# Patient Record
Sex: MALE | Race: WHITE | Employment: OTHER | URBAN - METROPOLITAN AREA
[De-identification: names, ages, dates, MRNs, and addresses within clinical notes are randomized per-mention and may not be internally consistent; named-entity substitution may affect disease eponyms.]

---

## 2018-08-25 PROBLEM — T50.901A DRUG OVERDOSE, ACCIDENTAL OR UNINTENTIONAL, INITIAL ENCOUNTER: Status: ACTIVE | Noted: 2018-08-25

## 2021-08-30 ENCOUNTER — HOSPITAL ENCOUNTER (EMERGENCY)
Age: 46
Discharge: HOME | End: 2021-08-30
Attending: EMERGENCY MEDICINE
Payer: MEDICAID

## 2021-08-30 VITALS
HEART RATE: 95 BPM | SYSTOLIC BLOOD PRESSURE: 119 MMHG | HEIGHT: 71 IN | TEMPERATURE: 98.4 F | WEIGHT: 216 LBS | BODY MASS INDEX: 30.24 KG/M2 | DIASTOLIC BLOOD PRESSURE: 87 MMHG | OXYGEN SATURATION: 98 % | RESPIRATION RATE: 16 BRPM

## 2021-08-30 DIAGNOSIS — L08.9 INFECTED WOUND: ICD-10-CM

## 2021-08-30 DIAGNOSIS — S81.801A LEG WOUND, RIGHT, INITIAL ENCOUNTER: Primary | ICD-10-CM

## 2021-08-30 DIAGNOSIS — T14.8XXA INFECTED WOUND: ICD-10-CM

## 2021-08-30 LAB
ALBUMIN SERPL-MCNC: 4.3 GM/DL (ref 3.4–5)
ALP BLD-CCNC: 92 IU/L (ref 40–129)
ALT SERPL-CCNC: 13 U/L (ref 10–40)
ANION GAP SERPL CALCULATED.3IONS-SCNC: 8 MMOL/L (ref 4–16)
APTT: 33.3 SECONDS (ref 25.1–37.1)
AST SERPL-CCNC: 16 IU/L (ref 15–37)
BASOPHILS ABSOLUTE: 0 K/CU MM
BASOPHILS RELATIVE PERCENT: 0.4 % (ref 0–1)
BILIRUB SERPL-MCNC: 0.2 MG/DL (ref 0–1)
BILIRUBIN DIRECT: 0.2 MG/DL (ref 0–0.3)
BILIRUBIN, INDIRECT: 0 MG/DL (ref 0–0.7)
BUN BLDV-MCNC: 16 MG/DL (ref 6–23)
CALCIUM SERPL-MCNC: 9.9 MG/DL (ref 8.3–10.6)
CHLORIDE BLD-SCNC: 102 MMOL/L (ref 99–110)
CO2: 30 MMOL/L (ref 21–32)
CREAT SERPL-MCNC: 0.8 MG/DL (ref 0.9–1.3)
DIFFERENTIAL TYPE: ABNORMAL
EOSINOPHILS ABSOLUTE: 0.2 K/CU MM
EOSINOPHILS RELATIVE PERCENT: 1.4 % (ref 0–3)
GFR AFRICAN AMERICAN: >60 ML/MIN/1.73M2
GFR NON-AFRICAN AMERICAN: >60 ML/MIN/1.73M2
GLUCOSE BLD-MCNC: 102 MG/DL (ref 70–99)
HCT VFR BLD CALC: 38.5 % (ref 42–52)
HEMOGLOBIN: 12.2 GM/DL (ref 13.5–18)
IMMATURE NEUTROPHIL %: 0.3 % (ref 0–0.43)
INR BLD: 0.91 INDEX
LYMPHOCYTES ABSOLUTE: 2.9 K/CU MM
LYMPHOCYTES RELATIVE PERCENT: 25.3 % (ref 24–44)
MCH RBC QN AUTO: 29 PG (ref 27–31)
MCHC RBC AUTO-ENTMCNC: 31.7 % (ref 32–36)
MCV RBC AUTO: 91.7 FL (ref 78–100)
MONOCYTES ABSOLUTE: 0.6 K/CU MM
MONOCYTES RELATIVE PERCENT: 5.7 % (ref 0–4)
PDW BLD-RTO: 13.2 % (ref 11.7–14.9)
PLATELET # BLD: 301 K/CU MM (ref 140–440)
PMV BLD AUTO: 8.8 FL (ref 7.5–11.1)
POTASSIUM SERPL-SCNC: 4.2 MMOL/L (ref 3.5–5.1)
PROTHROMBIN TIME: 12.2 SECONDS (ref 11.7–14.5)
RBC # BLD: 4.2 M/CU MM (ref 4.6–6.2)
SEGMENTED NEUTROPHILS ABSOLUTE COUNT: 7.6 K/CU MM
SEGMENTED NEUTROPHILS RELATIVE PERCENT: 66.9 % (ref 36–66)
SODIUM BLD-SCNC: 140 MMOL/L (ref 135–145)
TOTAL IMMATURE NEUTOROPHIL: 0.03 K/CU MM
TOTAL PROTEIN: 7.3 GM/DL (ref 6.4–8.2)
WBC # BLD: 11.3 K/CU MM (ref 4–10.5)

## 2021-08-30 PROCEDURE — 80053 COMPREHEN METABOLIC PANEL: CPT

## 2021-08-30 PROCEDURE — 99283 EMERGENCY DEPT VISIT LOW MDM: CPT

## 2021-08-30 PROCEDURE — 85610 PROTHROMBIN TIME: CPT

## 2021-08-30 PROCEDURE — 85025 COMPLETE CBC W/AUTO DIFF WBC: CPT

## 2021-08-30 PROCEDURE — 6370000000 HC RX 637 (ALT 250 FOR IP): Performed by: EMERGENCY MEDICINE

## 2021-08-30 PROCEDURE — 85730 THROMBOPLASTIN TIME PARTIAL: CPT

## 2021-08-30 PROCEDURE — 82248 BILIRUBIN DIRECT: CPT

## 2021-08-30 RX ORDER — METOPROLOL SUCCINATE 25 MG/1
25 TABLET, EXTENDED RELEASE ORAL DAILY
COMMUNITY
Start: 2021-06-30 | End: 2021-12-27

## 2021-08-30 RX ORDER — CLOPIDOGREL BISULFATE 75 MG/1
75 TABLET ORAL DAILY
COMMUNITY
End: 2021-12-27

## 2021-08-30 RX ORDER — SULFAMETHOXAZOLE AND TRIMETHOPRIM 800; 160 MG/1; MG/1
1 TABLET ORAL ONCE
Status: COMPLETED | OUTPATIENT
Start: 2021-08-30 | End: 2021-08-30

## 2021-08-30 RX ORDER — AMITRIPTYLINE HYDROCHLORIDE 10 MG/1
10 TABLET, FILM COATED ORAL NIGHTLY
COMMUNITY
Start: 2021-06-22 | End: 2021-12-27

## 2021-08-30 RX ORDER — AMLODIPINE BESYLATE 5 MG/1
5 TABLET ORAL DAILY
COMMUNITY
Start: 2021-06-13 | End: 2021-12-27

## 2021-08-30 RX ORDER — SULFAMETHOXAZOLE AND TRIMETHOPRIM 800; 160 MG/1; MG/1
1 TABLET ORAL 2 TIMES DAILY
Qty: 20 TABLET | Refills: 0 | Status: SHIPPED | OUTPATIENT
Start: 2021-08-30 | End: 2021-09-09

## 2021-08-30 RX ADMIN — SULFAMETHOXAZOLE AND TRIMETHOPRIM 1 TABLET: 800; 160 TABLET ORAL at 19:48

## 2021-08-30 ASSESSMENT — PAIN DESCRIPTION - DESCRIPTORS: DESCRIPTORS: CONSTANT

## 2021-08-30 ASSESSMENT — PAIN SCALES - GENERAL: PAINLEVEL_OUTOF10: 10

## 2021-08-30 ASSESSMENT — PAIN DESCRIPTION - LOCATION: LOCATION: LEG

## 2021-08-30 ASSESSMENT — PAIN DESCRIPTION - PAIN TYPE: TYPE: CHRONIC PAIN

## 2021-08-30 ASSESSMENT — PAIN DESCRIPTION - ORIENTATION: ORIENTATION: RIGHT

## 2021-08-30 ASSESSMENT — PAIN DESCRIPTION - FREQUENCY: FREQUENCY: CONTINUOUS

## 2021-08-30 NOTE — ED PROVIDER NOTES
[address]  EMERGENCYDEPARTMENT ENCOUNTER      Pt Name: Franklin Austin  MRN: [id number]  [place] 1975of evaluation: 8/30/2021  Provider:Ar Faulkner MD    [address] [place] [address]       Chief Complaint   Patient presents with    Wound Infection     from incision 4 months ago          [facility]    Franklin Austin is a 39 y.o. male who presents to the emergency department with wound infection. Patient had a tibial fracture several months ago, he just had an abscess at the scar that popped open and has been getting red around the area. No fevers. Not feeling ill. No pain anywhere else. Nothing taken for the symptom. Nursing Notes were reviewed. REVIEW OF SYSTEMS       Review of Systems    10 point review of systems was performed and was negative exceptas specifically noted in the HPI. PAST MEDICAL HISTORY     Past Medical History:   Diagnosis Date    Hypertension          SURGICAL HISTORY       Past Surgical History:   Procedure Laterality Date    FRACTURE SURGERY      right leg          CURRENT MEDICATIONS       Previous Medications    AMITRIPTYLINE (ELAVIL) 10 MG TABLET    Take 10 mg by mouth nightly    AMLODIPINE (NORVASC) 5 MG TABLET    Take 5 mg by mouth daily    CLOPIDOGREL (PLAVIX) 75 MG TABLET    Take 75 mg by mouth daily    METOPROLOL SUCCINATE (TOPROL XL) 25 MG EXTENDED RELEASE TABLET    Take 25 mg by mouth daily       ALLERGIES     Patient has no known allergies. FAMILY HISTORY     History reviewed. No pertinent family history.        SOCIAL HISTORY       Social History     Socioeconomic History    Marital status:      Spouse name: None    Number of children: None    Years of education: None    Highest education level: None   Occupational History    None   Tobacco Use    Smoking status: Current Every Day Smoker     Packs/day: 1.00    Smokeless tobacco: Never Used   Substance and Sexual Activity    Alcohol use: Yes     Comment: social    Drug use: Yes     Types: Marijuana, Opiates     Sexual activity: Yes     Partners: Female   Other Topics Concern    None   Social History Narrative    None     Social Determinants of Health     Financial Resource Strain:     Difficulty of Paying Living Expenses:    Food Insecurity:     Worried About Running Out of Food in the Last Year:     [address] in the Last Year:    Transportation Needs:     Lack of Transportation (Medical):  Lack of Transportation (Non-Medical):    Physical Activity:     Days of Exercise per Week:     Minutes of Exercise per Session:    Stress:     Feeling of Stress :    Social Connections:     Frequency of Communication with Friends and Family:     Frequency of Social Gatherings with Friends and Family:     Attends Catholic Services:     Active Member of Clubs or Organizations:     Attends Club or Organization Meetings:     Marital Status:    Intimate Partner Violence:     Fear of Current or Ex-Partner:     Emotionally Abused:     Physically Abused:     Sexually Abused:        SCREENINGS             PHYSICAL EXAM       ED Triage Vitals   BP Temp Temp src Pulse Resp SpO2 Height Weight   08/30/21 1842 08/30/21 1841 -- 08/30/21 1841 08/30/21 1841 08/30/21 1841 08/30/21 1841 08/30/21 1841   119/87 98.4 °F (36.9 °C)  95 16 98 % 5' 11\" (1.803 m) 216 lb (98 kg)       Physical Exam  General appearance: Alert, cooperative, no distress, appears stated age. Head:  Normocephalic, without obvious abnormality, atraumatic. HEENT: Mucous membranes moist.  Neck: Full ROM, trachea midline, no JVD  Lungs: No respiratory distress  Cardiovasular: Perfusing extremities  Abdomen: Nontender, no guarding  Extremities: Atraumatic, full ROM  Skin: There is a recently broken abscess without any remaining fluid collection with surrounding erythema and warmth. Minimal active purulence.   Neurologic: Alert and oriented x3, motor grossly normal, clear speech      DIAGNOSTIC RESULTS     EKG:     IMAGING:   Non-plain film images such as CT, Ultrasound and MRI are read by the radiologist.Plain radiographic images are visualized and preliminarily interpreted by the emergency physician with the below findings:        Interpretation per the Radiologist below, if available at the time of this note:    No orders to display         EDBEDSIDE ULTRASOUND:   Performed by Kiran Leroy - none    LABS:  Labs Reviewed   CBC WITH AUTO DIFFERENTIAL   BASIC METABOLIC PANEL   HEPATIC FUNCTION PANEL   APTT   PROTIME-INR       All other labs were within normal range or not returned as of this dictation. EMERGENCY DEPARTMENT COURSE and DIFFERENTIAL DIAGNOSIS/MDM:   Vitals:    Vitals:    08/30/21 1841 08/30/21 1842   BP:  119/87   Pulse: 95    Resp: 16    Temp: 98.4 °F (36.9 °C)    SpO2: 98%    Weight: 216 lb (98 kg)    Height: 5' 11\" (1.803 m)        Medications   sulfamethoxazole-trimethoprim (BACTRIM DS;SEPTRA DS) 800-160 MG per tablet 1 tablet (has no administration in time range)       MDM     Patient presents with right shin pain. Vital signs are stable, examination shows patient in no acute distress sitting comfortably on room air. He has a wound on his right shin that has surrounding cellulitis, no incision and drainage needed because there is no remaining fluid collection. I will prescribe Bactrim for 10 days since the patient is tolerated this before. REASSESSMENT          CRITICAL CARE TIME   Critical Care time was 0 minutes, excluding separately reportable procedures. There was a high probability of clinically significant/life threatening deteriorationin the patient's condition which required my urgent intervention. CONSULTS:  None     PROCEDURES:  Unless otherwise noted below, none     Procedures    FINAL IMPRESSION      1. Leg wound, right, initial encounter    2.  Infected wound          DISPOSITION/PLAN   DISPOSITION Decision To Discharge 08/30/2021 07:44:42 PM      PATIENT REFERRED TO:  [address] [address]  [phone]    If symptoms worsen      DISCHARGE MEDICATIONS:  New Prescriptions    SULFAMETHOXAZOLE-TRIMETHOPRIM (BACTRIM DS) 800-160 MG PER TABLET    Take 1 tablet by mouth 2 times daily for 10 days          (Please note that portions of this note were completed with a voicerecognition program.  Efforts were made to edit the dictations but occasionally words are mis-transcribed.)    Kae Dubin, MD (electronically signed)  Attending Emergency Physician            Kae Dubin, MD  08/30/21 0788

## 2021-08-30 NOTE — ED NOTES
Patient verbalizes understanding of discharge instructions. Patient walks out of ED with an upright and steady gait with even and unlabored respirations.       Barb Dillard RN  08/30/21 4036

## 2021-12-27 ENCOUNTER — APPOINTMENT (OUTPATIENT)
Dept: ULTRASOUND IMAGING | Age: 46
End: 2021-12-27
Payer: MEDICAID

## 2021-12-27 ENCOUNTER — APPOINTMENT (OUTPATIENT)
Dept: GENERAL RADIOLOGY | Age: 46
End: 2021-12-27
Payer: MEDICAID

## 2021-12-27 ENCOUNTER — HOSPITAL ENCOUNTER (EMERGENCY)
Age: 46
Discharge: HOME | End: 2021-12-27
Attending: EMERGENCY MEDICINE
Payer: MEDICAID

## 2021-12-27 VITALS
RESPIRATION RATE: 18 BRPM | TEMPERATURE: 98.4 F | OXYGEN SATURATION: 99 % | DIASTOLIC BLOOD PRESSURE: 79 MMHG | BODY MASS INDEX: 32.2 KG/M2 | WEIGHT: 230 LBS | HEART RATE: 68 BPM | HEIGHT: 71 IN | SYSTOLIC BLOOD PRESSURE: 125 MMHG

## 2021-12-27 DIAGNOSIS — M79.604 RIGHT LEG PAIN: Primary | ICD-10-CM

## 2021-12-27 LAB
ANION GAP SERPL CALCULATED.3IONS-SCNC: 7 MMOL/L (ref 4–16)
BASE EXCESS MIXED: 0.8 (ref 0–1.2)
BASE EXCESS: ABNORMAL (ref 0–3.3)
BASOPHILS ABSOLUTE: 0 K/CU MM
BASOPHILS RELATIVE PERCENT: 0.2 % (ref 0–1)
BUN BLDV-MCNC: 16 MG/DL (ref 6–23)
CALCIUM SERPL-MCNC: 9.6 MG/DL (ref 8.3–10.6)
CHLORIDE BLD-SCNC: 109 MMOL/L (ref 99–110)
CO2: 27 MMOL/L (ref 21–32)
CO2: 27 MMOL/L (ref 21–32)
CREAT SERPL-MCNC: 0.6 MG/DL (ref 0.9–1.3)
DIFFERENTIAL TYPE: ABNORMAL
EOSINOPHILS ABSOLUTE: 0 K/CU MM
EOSINOPHILS RELATIVE PERCENT: 0.2 % (ref 0–3)
GFR AFRICAN AMERICAN: >60 ML/MIN/1.73M2
GFR NON-AFRICAN AMERICAN: >60 ML/MIN/1.73M2
GLUCOSE BLD-MCNC: 110 MG/DL (ref 70–99)
GLUCOSE BLD-MCNC: 110 MG/DL (ref 70–99)
HCO3 ARTERIAL: 25.5 MMOL/L (ref 18–23)
HCT VFR BLD CALC: 43.3 % (ref 42–52)
HCT VFR BLD CALC: 48 % (ref 42–52)
HEMOGLOBIN: 14.2 GM/DL (ref 13.5–18)
HEMOGLOBIN: 16.2 GM/DL (ref 13.5–18)
IMMATURE NEUTROPHIL %: 0.3 % (ref 0–0.43)
LYMPHOCYTES ABSOLUTE: 2.1 K/CU MM
LYMPHOCYTES RELATIVE PERCENT: 13.9 % (ref 24–44)
MCH RBC QN AUTO: 29.3 PG (ref 27–31)
MCHC RBC AUTO-ENTMCNC: 32.8 % (ref 32–36)
MCV RBC AUTO: 89.5 FL (ref 78–100)
MONOCYTES ABSOLUTE: 0.7 K/CU MM
MONOCYTES RELATIVE PERCENT: 4.7 % (ref 0–4)
O2 SATURATION: 53 % (ref 96–97)
PCO2 ARTERIAL: 40.2 MMHG (ref 32–45)
PDW BLD-RTO: 13.1 % (ref 11.7–14.9)
PH BLOOD: 7.41 (ref 7.34–7.45)
PLATELET # BLD: 278 K/CU MM (ref 140–440)
PMV BLD AUTO: 9.2 FL (ref 7.5–11.1)
PO2 ARTERIAL: 27.8 MMHG (ref 75–100)
POC CALCIUM: 1.15 MMOL/L (ref 1.12–1.32)
POC CHLORIDE: 109 MMOL/L (ref 98–109)
POC CREATININE: 0.6 MG/DL (ref 0.9–1.3)
POTASSIUM SERPL-SCNC: 3.9 MMOL/L (ref 3.5–4.5)
POTASSIUM SERPL-SCNC: 3.9 MMOL/L (ref 3.5–5.1)
RBC # BLD: 4.84 M/CU MM (ref 4.6–6.2)
SEGMENTED NEUTROPHILS ABSOLUTE COUNT: 12 K/CU MM
SEGMENTED NEUTROPHILS RELATIVE PERCENT: 80.7 % (ref 36–66)
SODIUM BLD-SCNC: 143 MMOL/L (ref 135–145)
SODIUM BLD-SCNC: 143 MMOL/L (ref 138–146)
SOURCE, BLOOD GAS: ABNORMAL
TOTAL IMMATURE NEUTOROPHIL: 0.04 K/CU MM
WBC # BLD: 14.9 K/CU MM (ref 4–10.5)

## 2021-12-27 PROCEDURE — 6360000002 HC RX W HCPCS: Performed by: EMERGENCY MEDICINE

## 2021-12-27 PROCEDURE — 82803 BLOOD GASES ANY COMBINATION: CPT

## 2021-12-27 PROCEDURE — 85025 COMPLETE CBC W/AUTO DIFF WBC: CPT

## 2021-12-27 PROCEDURE — 73590 X-RAY EXAM OF LOWER LEG: CPT

## 2021-12-27 PROCEDURE — 85014 HEMATOCRIT: CPT

## 2021-12-27 PROCEDURE — 80051 ELECTROLYTE PANEL: CPT

## 2021-12-27 PROCEDURE — 80048 BASIC METABOLIC PNL TOTAL CA: CPT

## 2021-12-27 PROCEDURE — 85018 HEMOGLOBIN: CPT

## 2021-12-27 PROCEDURE — 82565 ASSAY OF CREATININE: CPT

## 2021-12-27 PROCEDURE — 99283 EMERGENCY DEPT VISIT LOW MDM: CPT

## 2021-12-27 PROCEDURE — 93971 EXTREMITY STUDY: CPT

## 2021-12-27 PROCEDURE — 82962 GLUCOSE BLOOD TEST: CPT

## 2021-12-27 PROCEDURE — 6370000000 HC RX 637 (ALT 250 FOR IP): Performed by: EMERGENCY MEDICINE

## 2021-12-27 PROCEDURE — 96372 THER/PROPH/DIAG INJ SC/IM: CPT

## 2021-12-27 RX ORDER — ONDANSETRON 4 MG/1
4 TABLET, ORALLY DISINTEGRATING ORAL ONCE
Status: COMPLETED | OUTPATIENT
Start: 2021-12-27 | End: 2021-12-27

## 2021-12-27 RX ORDER — HYDROCODONE BITARTRATE AND ACETAMINOPHEN 5; 325 MG/1; MG/1
1 TABLET ORAL EVERY 6 HOURS PRN
Qty: 5 TABLET | Refills: 0 | Status: SHIPPED | OUTPATIENT
Start: 2021-12-27 | End: 2021-12-29

## 2021-12-27 RX ORDER — HYDROMORPHONE HCL 110MG/55ML
0.5 PATIENT CONTROLLED ANALGESIA SYRINGE INTRAVENOUS ONCE
Status: COMPLETED | OUTPATIENT
Start: 2021-12-27 | End: 2021-12-27

## 2021-12-27 RX ADMIN — HYDROMORPHONE HYDROCHLORIDE 0.5 MG: 2 INJECTION INTRAMUSCULAR; INTRAVENOUS; SUBCUTANEOUS at 08:38

## 2021-12-27 RX ADMIN — ONDANSETRON 4 MG: 4 TABLET, ORALLY DISINTEGRATING ORAL at 08:37

## 2021-12-27 ASSESSMENT — PAIN DESCRIPTION - ORIENTATION: ORIENTATION: RIGHT;LOWER

## 2021-12-27 ASSESSMENT — PAIN DESCRIPTION - LOCATION: LOCATION: LEG;ANKLE

## 2021-12-27 ASSESSMENT — PAIN SCALES - GENERAL
PAINLEVEL_OUTOF10: 10
PAINLEVEL_OUTOF10: 6
PAINLEVEL_OUTOF10: 10

## 2021-12-27 ASSESSMENT — PAIN DESCRIPTION - PAIN TYPE: TYPE: ACUTE PAIN

## 2021-12-27 ASSESSMENT — PAIN DESCRIPTION - DESCRIPTORS: DESCRIPTORS: ACHING

## 2021-12-27 ASSESSMENT — PAIN DESCRIPTION - FREQUENCY: FREQUENCY: CONTINUOUS

## 2021-12-27 NOTE — ED PROVIDER NOTES
Emergency Department Encounter    Patient: Rocco Durant  MRN: [id number]  : 1975  Date of Evaluation: 2021  ED Provider:  Stephon Hylton MD    Triage Chief Complaint:   Leg Pain (right lower leg/ankle)    Clinical Impression:  1. Right leg pain      Disposition referral (if applicable):  Doris Elgin, DO  [address]  [phone]    Call in 1 day      ED Provider Disposition Time  DISPOSITION Decision To Discharge 2021 12:12:08 PM       MDM:  Patient with history of right lower extremity pain status post fracture and orthopedic surgery as below presents with left lower extremity/ankle pain. Patient is not using a cam boot, brace or orthotic. Patient does have a leukocytosis as below. Clinically, there is no clear evidence of infection. X-ray evaluation shows partially healed fracture of the tibia and intact hardware involving the fibula. Patient is neurovascularly intact. Venous Doppler ultrasound was obtained which showed no evidence of DVT. Compartments are soft with no evidence of compartment syndrome. No arterial insufficiency. Dr Tatum--on call for Dr Eller--recommended elevation and rest.  Strict return precautions for increased pain, discharge from the wound, spreading redness, fever, or other concerns. He was okay with patient being discharged with follow-up with Dr. Kirill Kirby. Pain treated as below in the emergency department. Small course of outpatient pain medication as below. Patient discharged with strict return precautions and follow-up instructions. Patient verbalized understanding and agreement to plan.     Medications ordered in the ED:  ED Medication Orders (From admission, onward)    Start Ordered     Status Ordering Provider    21 0815 21 0805  HYDROmorphone (DILAUDID) injection 0.5 mg  ONCE         Last MAR action: Given - by Santana Mejia on 21 at [address]    21 0815 21 0805  ondansetron (ZOFRAN-ODT) disintegrating tablet 4 mg  ONCE         Last MAR action: Given - by Esau Pitt on 12/27/21 at 0837 [facility]          Disposition medications (if applicable):  Discharge Medication List as of 12/27/2021 12:24 PM      START taking these medications    Details   HYDROcodone-acetaminophen (NORCO) 5-325 MG per tablet Take 1 tablet by mouth every 6 hours as needed for Pain for up to 2 days. Intended supply: 3 days. Take lowest dose possible to manage pain, Disp-5 tablet, R-0Normal               HPI:  Duran Pleitez is a 42 y.o. male that presents complaining of 2-week history of 10 out of 10 pain in the right ankle/distal leg. Patient states that approximately 8 to 9 months ago, he fractured his tibia/fibula requiring orthopedic surgery. Surgery was complicated by infected hardware with washouts and revision. Patient states that surgeon is Dr. Bishop Diaz. Patient is currently on doxycycline. Patient denies fever, nausea, vomiting, diarrhea. No chest pain or shortness of breath. Patient states that he has had intermittent similar symptoms although over the past 2 weeks, he is noted some waxing and waning swelling of the right ankle, worsened discoloration at the operative site, and increased pain. No radiation. No known alleviating factors. Patient has been treating with over-the-counter medications.     ROS - see HPI, below listed is current ROS at time of my eval:  General:  No fevers  Eyes:  No eye discharge  ENT:  No ear discharge  Cardiovascular:  No palpitations  Respiratory:  No wheezing  Gastrointestinal:  No hematemesis  Skin:  No petechiae  Neurologic:  No headache  Psychiatric:  No homicidal ideation  Genitourinary:  No hematuria  Endocrine:  No unexpected weight gain  Extremities: Mild edema of the right ankle    Physical Exam:  Triage VS:    ED Triage Vitals [12/27/21 0749]   Enc Vitals Group      /87      Pulse 92      Resp 18      Temp 98.4 °F (36.9 °C)      Temp Source Oral file   Social History Narrative    Not on file     Social Determinants of Health     Financial Resource Strain:     Difficulty of Paying Living Expenses: Not on file   Food Insecurity:     Worried About Running Out of Food in the Last Year: Not on file    [place] of Food in the Last Year: Not on file   Transportation Needs:     Lack of Transportation (Medical): Not on file    Lack of Transportation (Non-Medical): Not on file   Physical Activity:     Days of Exercise per Week: Not on file    Minutes of Exercise per Session: Not on file   Stress:     Feeling of Stress : Not on file   Social Connections:     Frequency of Communication with Friends and Family: Not on file    Frequency of Social Gatherings with Friends and Family: Not on file    Attends Adventist Services: Not on file    Active Member of [address] [place] [address] [employer] or Organizations: Not on file    Attends Club or Organization Meetings: Not on file    Marital Status: Not on file   Intimate Partner Violence:     Fear of Current or Ex-Partner: Not on file    Emotionally Abused: Not on file    Physically Abused: Not on file    Sexually Abused: Not on file   Housing Stability:     Unable to Pay for Housing in the Last Year: Not on file    Number of [place] in the Last Year: Not on file    Unstable Housing in the Last Year: Not on file     No current facility-administered medications for this encounter. Current Outpatient Medications   Medication Sig Dispense Refill    HYDROcodone-acetaminophen (NORCO) 5-325 MG per tablet Take 1 tablet by mouth every 6 hours as needed for Pain for up to 2 days. Intended supply: 3 days.  Take lowest dose possible to manage pain 5 tablet 0     No Known Allergies    Nursing Notes Reviewed    I have reviewed and interpreted all of the currently available lab results from this visit (if applicable):  Results for orders placed or performed during the hospital encounter of 12/27/21   CBC auto diff   Result Value Ref Range WBC 14.9 (H) 4.0 - 10.5 K/CU MM    RBC 4.84 4.6 - 6.2 M/CU MM    Hemoglobin 14.2 13.5 - 18.0 GM/DL    Hematocrit 43.3 42 - 52 %    MCV 89.5 78 - 100 FL    MCH 29.3 27 - 31 PG    MCHC 32.8 32.0 - 36.0 %    RDW 13.1 11.7 - 14.9 %    Platelets 614 154 - 400 K/CU MM    MPV 9.2 7.5 - 11.1 FL    Differential Type AUTOMATED DIFFERENTIAL     Segs Relative 80.7 (H) 36 - 66 %    Lymphocytes % 13.9 (L) 24 - 44 %    Monocytes % 4.7 (H) 0 - 4 %    Eosinophils % 0.2 0 - 3 %    Basophils % 0.2 0 - 1 %    Segs Absolute 12.0 K/CU MM    Lymphocytes Absolute 2.1 K/CU MM    Monocytes Absolute 0.7 K/CU MM    Eosinophils Absolute 0.0 K/CU MM    Basophils Absolute 0.0 K/CU MM    Immature Neutrophil % 0.3 0 - 0.43 %    Total Immature Neutrophil 0.04 K/CU MM   BMP   Result Value Ref Range    Sodium 143 135 - 145 MMOL/L    Potassium 3.9 3.5 - 5.1 MMOL/L    Chloride 109 99 - 110 mMol/L    CO2 27 21 - 32 MMOL/L    Anion Gap 7 4 - 16    BUN 16 6 - 23 MG/DL    CREATININE 0.6 (L) 0.9 - 1.3 MG/DL    Glucose 110 (H) 70 - 99 MG/DL    Calcium 9.6 8.3 - 10.6 MG/DL    GFR Non-African American >60 >60 mL/min/1.73m2    GFR African American >60 >60 mL/min/1.73m2   POC CRITICAL CARE PROFILE   Result Value Ref Range    pH, Bld 7.41 7.34 - 7.45    pCO2, Arterial 40.2 32 - 45 MMHG    pO2, Arterial 27.8 (L) 75 - 100 MMHG    HCO3, Arterial 25.5 (H) 18 - 23 MMOL/L    Base Excess HIDE 0 - 3.3    Base Exc, Mixed 0.8 0 - 1.2    O2 Sat 53.0 (L) 96 - 97 %    CO2 27 21 - 32 MMOL/L    Sodium 143 138 - 146 MMOL/L    Potassium 3.9 3.5 - 4.5 MMOL/L    POC CALCIUM 1.15 1.12 - 1.32 MMOL/L    POC Glucose 110 (H) 70 - 99 MG/DL    Hematocrit 48.0 42 - 52 %    Hemoglobin 16.2 13.5 - 18.0 GM/DL    POC Chloride 109 98 - 109 MMOL/L    POC Creatinine 0.6 (L) 0.9 - 1.3 MG/DL    Source: Venous       Radiographs (if obtained):  Radiologist's Report Reviewed:  VL DUP LOWER EXTREMITY VENOUS RIGHT   Final Result   No evidence of DVT in the right lower extremity.       RECOMMENDATIONS: Unavailable         XR TIBIA FIBULA RIGHT (2 VIEWS)   Preliminary Result   Posttraumatic and postsurgical changes of the distal tibia and fibula. Comment: Please note this report has been produced using speech recognition software and may contain errors related to that system including errors in grammar, punctuation, and spelling, as well as words and phrases that may be inappropriate. Efforts were made to edit the dictations.         Joaquina Christina MD  12/28/21 2100

## 2021-12-27 NOTE — ED NOTES
Discharge instructions and prescriptions were reviewed and the patient will follow up with Ortho as discussed. The patient understands these instructions.                Tamiko Franklin RN  12/27/21 8784

## 2021-12-27 NOTE — ED NOTES
The patient presents to the er today with complaints of right ankle/leg pain. He reports that he fell 26 feet a few years ago and fractures the right leg. He had surgery on that leg and now is having increased pain.        Shelly Austin RN  12/27/21 7775

## 2022-01-03 ENCOUNTER — OFFICE VISIT (OUTPATIENT)
Dept: FAMILY MEDICINE CLINIC | Age: 47
End: 2022-01-03
Payer: MEDICAID

## 2022-01-03 VITALS
WEIGHT: 215.4 LBS | SYSTOLIC BLOOD PRESSURE: 110 MMHG | TEMPERATURE: 97.7 F | BODY MASS INDEX: 30.04 KG/M2 | OXYGEN SATURATION: 95 % | DIASTOLIC BLOOD PRESSURE: 64 MMHG | HEART RATE: 82 BPM

## 2022-01-03 DIAGNOSIS — R20.0 RIGHT SIDED NUMBNESS: ICD-10-CM

## 2022-01-03 DIAGNOSIS — Z11.4 SCREENING FOR HIV (HUMAN IMMUNODEFICIENCY VIRUS): ICD-10-CM

## 2022-01-03 DIAGNOSIS — Z86.2 HISTORY OF ANEMIA: ICD-10-CM

## 2022-01-03 DIAGNOSIS — D72.829 LEUKOCYTOSIS, UNSPECIFIED TYPE: ICD-10-CM

## 2022-01-03 DIAGNOSIS — T84.7XXS INFECTED HARDWARE IN RIGHT LEG, SEQUELA: Primary | ICD-10-CM

## 2022-01-03 DIAGNOSIS — L60.9 NAIL ABNORMALITIES: ICD-10-CM

## 2022-01-03 DIAGNOSIS — I42.9 CARDIOMYOPATHY, UNSPECIFIED TYPE (HCC): ICD-10-CM

## 2022-01-03 DIAGNOSIS — N17.9 ACUTE KIDNEY INJURY (HCC): ICD-10-CM

## 2022-01-03 DIAGNOSIS — F17.200 SMOKER: ICD-10-CM

## 2022-01-03 DIAGNOSIS — D50.8 IRON DEFICIENCY ANEMIA SECONDARY TO INADEQUATE DIETARY IRON INTAKE: ICD-10-CM

## 2022-01-03 DIAGNOSIS — I21.4 ACUTE NON-ST SEGMENT ELEVATION MYOCARDIAL INFARCTION (HCC): ICD-10-CM

## 2022-01-03 DIAGNOSIS — I25.10 ATHEROSCLEROSIS OF CORONARY ARTERY OF NATIVE HEART WITHOUT ANGINA PECTORIS, UNSPECIFIED VESSEL OR LESION TYPE: ICD-10-CM

## 2022-01-03 DIAGNOSIS — Z13.1 SCREENING FOR DIABETES MELLITUS: ICD-10-CM

## 2022-01-03 DIAGNOSIS — I46.9 CARDIAC ARREST (HCC): ICD-10-CM

## 2022-01-03 DIAGNOSIS — Z11.59 NEED FOR HEPATITIS C SCREENING TEST: ICD-10-CM

## 2022-01-03 PROBLEM — M62.82 NON-TRAUMATIC RHABDOMYOLYSIS: Status: ACTIVE | Noted: 2021-05-28

## 2022-01-03 PROBLEM — R01.1 CARDIAC MURMUR, UNSPECIFIED: Status: ACTIVE | Noted: 2022-01-03

## 2022-01-03 PROBLEM — I25.9 CHRONIC ISCHEMIC HEART DISEASE: Status: ACTIVE | Noted: 2022-01-03

## 2022-01-03 LAB
BASOPHILS ABSOLUTE: 0.1 K/UL (ref 0–0.2)
BASOPHILS RELATIVE PERCENT: 0.5 %
EOSINOPHILS ABSOLUTE: 0 K/UL (ref 0–0.6)
EOSINOPHILS RELATIVE PERCENT: 0.4 %
HCT VFR BLD CALC: 37.4 % (ref 40.5–52.5)
HEMOGLOBIN: 12.4 G/DL (ref 13.5–17.5)
HEPATITIS C ANTIBODY INTERPRETATION: NORMAL
LYMPHOCYTES ABSOLUTE: 2.5 K/UL (ref 1–5.1)
LYMPHOCYTES RELATIVE PERCENT: 26.2 %
MCH RBC QN AUTO: 30 PG (ref 26–34)
MCHC RBC AUTO-ENTMCNC: 33.1 G/DL (ref 31–36)
MCV RBC AUTO: 90.8 FL (ref 80–100)
MONOCYTES ABSOLUTE: 0.5 K/UL (ref 0–1.3)
MONOCYTES RELATIVE PERCENT: 5.4 %
NEUTROPHILS ABSOLUTE: 6.4 K/UL (ref 1.7–7.7)
NEUTROPHILS RELATIVE PERCENT: 67.5 %
PDW BLD-RTO: 14 % (ref 12.4–15.4)
PLATELET # BLD: 274 K/UL (ref 135–450)
PMV BLD AUTO: 8.3 FL (ref 5–10.5)
RBC # BLD: 4.12 M/UL (ref 4.2–5.9)
WBC # BLD: 9.5 K/UL (ref 4–11)

## 2022-01-03 PROCEDURE — G8417 CALC BMI ABV UP PARAM F/U: HCPCS | Performed by: FAMILY MEDICINE

## 2022-01-03 PROCEDURE — 99204 OFFICE O/P NEW MOD 45 MIN: CPT | Performed by: FAMILY MEDICINE

## 2022-01-03 PROCEDURE — 4004F PT TOBACCO SCREEN RCVD TLK: CPT | Performed by: FAMILY MEDICINE

## 2022-01-03 PROCEDURE — G8484 FLU IMMUNIZE NO ADMIN: HCPCS | Performed by: FAMILY MEDICINE

## 2022-01-03 PROCEDURE — 36415 COLL VENOUS BLD VENIPUNCTURE: CPT | Performed by: FAMILY MEDICINE

## 2022-01-03 PROCEDURE — G8428 CUR MEDS NOT DOCUMENT: HCPCS | Performed by: FAMILY MEDICINE

## 2022-01-03 RX ORDER — BUPROPION HYDROCHLORIDE 150 MG/1
150 TABLET, EXTENDED RELEASE ORAL 2 TIMES DAILY
Qty: 60 TABLET | Refills: 0 | Status: SHIPPED | OUTPATIENT
Start: 2022-01-03 | End: 2022-01-04 | Stop reason: RX

## 2022-01-03 RX ORDER — DOXYCYCLINE HYCLATE 100 MG
100 TABLET ORAL 2 TIMES DAILY
Qty: 30 TABLET | Refills: 0 | Status: SHIPPED | OUTPATIENT
Start: 2022-01-03 | End: 2022-02-01 | Stop reason: SDUPTHER

## 2022-01-03 RX ORDER — DOXYCYCLINE HYCLATE 100 MG
TABLET ORAL
COMMUNITY
Start: 2021-11-19 | End: 2022-01-03 | Stop reason: SDUPTHER

## 2022-01-03 ASSESSMENT — ENCOUNTER SYMPTOMS
ABDOMINAL PAIN: 0
VOMITING: 0
SHORTNESS OF BREATH: 0
DIARRHEA: 0
COUGH: 0

## 2022-01-03 NOTE — PATIENT INSTRUCTIONS
Stopping Smoking: Care Instructions  Your Care Instructions     Cigarette smokers crave the nicotine in cigarettes. Giving it up is much harder than simply changing a habit. Your body has to stop craving the nicotine. It is hard to quit, but you can do it. There are many tools that people use to quit smoking. You may find that combining tools works best for you. There are several steps to quitting. First you get ready to quit. Then you get support to help you. After that, you learn new skills and behaviors to become a nonsmoker. For many people, a necessary step is getting and using medicine. Your doctor will help you set up the plan that best meets your needs. You may want to attend a smoking cessation program to help you quit smoking. When you choose a program, look for one that has proven success. Ask your doctor for ideas. You will greatly increase your chances of success if you take medicine as well as get counseling or join a cessation program.  Some of the changes you feel when you first quit tobacco are uncomfortable. Your body will miss the nicotine at first, and you may feel short-tempered and grumpy. You may have trouble sleeping or concentrating. Medicine can help you deal with these symptoms. You may struggle with changing your smoking habits and rituals. The last step is the tricky one: Be prepared for the smoking urge to continue for a time. This is a lot to deal with, but keep at it. You will feel better. Follow-up care is a key part of your treatment and safety. Be sure to make and go to all appointments, and call your doctor if you are having problems. It's also a good idea to know your test results and keep a list of the medicines you take. How can you care for yourself at home? · Ask your family, friends, and coworkers for support. You have a better chance of quitting if you have help and support.   · Join a support group, such as Nicotine Anonymous, for people who are trying to quit smoking. · Consider signing up for a smoking cessation program, such as the American Lung Association's Freedom from Smoking program.  · Get text messaging support. Go to the website at www.smokefree. gov to sign up for the [facility] program.  · Set a quit date. Pick your date carefully so that it is not right in the middle of a big deadline or stressful time. Once you quit, do not even take a puff. Get rid of all ashtrays and lighters after your last cigarette. Clean your house and your clothes so that they do not smell of smoke. · Learn how to be a nonsmoker. Think about ways you can avoid those things that make you reach for a cigarette. ? Avoid situations that put you at greatest risk for smoking. For some people, it is hard to have a drink with friends without smoking. For others, they might skip a coffee break with coworkers who smoke. ? Change your daily routine. Take a different route to work or eat a meal in a different place. · Cut down on stress. Calm yourself or release tension by doing an activity you enjoy, such as reading a book, taking a hot bath, or gardening. · Talk to your doctor or pharmacist about nicotine replacement therapy, which replaces the nicotine in your body. You still get nicotine but you do not use tobacco. Nicotine replacement products help you slowly reduce the amount of nicotine you need. These products come in several forms, many of them available over-the-counter:  ? Nicotine patches  ? Nicotine gum and lozenges  ? Nicotine inhaler  · Ask your doctor about bupropion (Wellbutrin) or varenicline (Chantix), which are prescription medicines. They do not contain nicotine. They help you by reducing withdrawal symptoms, such as stress and anxiety. · Some people find hypnosis, acupuncture, and massage helpful for ending the smoking habit. · Eat a healthy diet and get regular exercise. Having healthy habits will help your body move past its craving for nicotine.   · Be prepared to keep trying. Most people are not successful the first few times they try to quit. Do not get mad at yourself if you smoke again. Make a list of things you learned and think about when you want to try again, such as next week, next month, or next year. Where can you learn more? Go to https://chpenohemi.[employer]ing. org and sign in to your [employer] account. Enter C257 in the [employer] box to learn more about \"Stopping Smoking: Care Instructions. \"     If you do not have an account, please click on the \"Sign Up Now\" link. Current as of: February 11, 2021               Content Version: 13.1  © 2006-2021 [employer]. Care instructions adapted under license by [facility] ([facility]). If you have questions about a medical condition or this instruction, always ask your healthcare professional. [place] [address] any warranty or liability for your use of this information. Learning About Benefits From Quitting Smoking  How does quitting smoking make you healthier? If you're thinking about quitting smoking, you may have a few reasons to be smoke-free. Your health may be one of them. · When you quit smoking, you lower your risks for cancer, lung disease, heart attack, stroke, blood vessel disease, and blindness from macular degeneration. · When you're smoke-free, you get sick less often, and you heal faster. You are less likely to get colds, flu, bronchitis, and pneumonia. · As a nonsmoker, you may find that your mood is better and you are less stressed. When and how will you feel healthier? Quitting has real health benefits that start from day 1 of being smoke-free. And the longer you stay smoke-free, the healthier you get and the better you feel. The first hours  · After just 20 minutes, your blood pressure and heart rate go down. That means there's less stress on your heart and blood vessels.   · Within 12 hours, the level of carbon monoxide in your blood drops back to normal. That makes room for more oxygen. With more oxygen in your body, you may notice that you have more energy than when you smoked. After 2 weeks  · Your lungs start to work better. · Your risk of heart attack starts to drop. After 1 month  · When your lungs are clear, you cough less and breathe deeper, so it's easier to be active. · Your sense of taste and smell return. That means you can enjoy food more than you have since you started smoking. Over the years  · Over the years, your risks of heart disease, heart attack, and stroke are lower. · After 10 years, your risk of dying from lung cancer is cut by about half. And your risk for many other types of cancer is lower too. How would quitting help others in your life? When you quit smoking, you improve the health of everyone who now breathes in your smoke. · Their heart, lung, and cancer risks drop, much like yours. · They are sick less. For babies and small children, living smoke-free means they're less likely to have ear infections, pneumonia, and bronchitis. · If you're a woman who is or will be pregnant someday, quitting smoking means a healthier . · Children who are close to you are less likely to become adult smokers. Where can you learn more? Go to https://[employer]stars. org and sign in to your [employer] account. Enter 966 299 72 96 in the [facility] box to learn more about \"Learning About Benefits From Quitting Smoking. \"     If you do not have an account, please click on the \"Sign Up Now\" link. Current as of: 2021               Content Version: 13.1  © 7045-3134 [employer]wise[employer] Inc[employer]. Care instructions adapted under license by [facility] ([facility]). If you have questions about a medical condition or this instruction, always ask your healthcare professional. [place] [address] any warranty or liability for your use of this information.

## 2022-01-03 NOTE — ASSESSMENT & PLAN NOTE
Refer to a dermatologist to evaluate the nail abnormalities and skin abnormalities of the hands. I will also order an iron level.

## 2022-01-03 NOTE — ASSESSMENT & PLAN NOTE
I will order a temporary refill of doxycycline in case if his orthopedic doctor has not done it. He does have a pending appointment with an orthopedic doctor within the next week or 2.

## 2022-01-03 NOTE — PROGRESS NOTES
1/3/22    Clyde Villagomez  1975    SUBJECTIVE    HPI - Marbin Taveras is a 42 y.o. male who presents today for evaluation of:  Chief Complaint   Patient presents with   [facility].     In summary he had a right tibia and fibula fracture in April 2021. When he was weaned off of prescribed pain medications in May or June 2021 he took a street drug that had fentanyl in it and was found by his significant other in cardiac arrest.  During the recovery from the cardiac arrest he had an infection in the right lower leg and some screws on the original hardware were broken. Currently his orthopedic doctors think that he will likely need a right lower leg amputation and he has a pending appointment with a plastic surgeon to get another opinion. He would like an opinion from another orthopedic surgeon, too. RT tib/fib fx: 4/2021. He is in the process of recovery and under ortho care. There was a break in 2 screws and infection. He has pending appts with plastic surgeon. He has been taking doxycycline x 4 months. After the leg injury in June 2021 he had an accidental OD with a med containing fentanyl with related cardiac arrest, MI, THI. Hx of whole rt side swelling and compartment syndrome. He has ongoing tingling rt side of body. He still has tingling rt post thigh and rt half of scrotum. Depression: situational, no hx of depression prior. Hand and nails : rash x 16 yr. 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 Left 1 & 3 & 4 nails affected. Rt 3 nail affected. Review of Systems   Constitutional: Negative for fatigue and fever. Respiratory: Negative for cough and shortness of breath. Cardiovascular: Positive for leg swelling. Negative for chest pain. Gastrointestinal: Negative for abdominal pain, diarrhea and vomiting. Psychiatric/Behavioral: Positive for dysphoric mood, self-injury and suicidal ideas.      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 OBJECTIVE    /64 (Site: Left Upper Arm, Position: Sitting, Cuff Size: Large Adult)   Pulse 82   Temp 97.7 °F (36.5 °C) (Infrared)   Wt 215 lb 6.4 oz (97.7 kg)   SpO2 95%   BMI 30.04 kg/m²     Physical Exam   Constitutional:       General: Not in acute distress. Appearance: Normal appearance. Not ill-appearing. Eyes:      General: No scleral icterus. Cardiovascular:      Rate and Rhythm: Normal rate and regular rhythm. Heart sounds: No murmur heard. No friction rub. No gallop. Normal bilateral posterior tibial and dorsalis pedis pulses. Pulmonary:      Effort: Pulmonary effort is normal. No respiratory distress. Breath sounds: No wheezing, rhonchi or rales. Abdominal:      Palpations: Abdomen is soft. There is no mass. Tenderness: There is no abdominal tenderness. Musculoskeletal:     Moves all extremities normally. Skin:     General: Skin is warm. Coloration: Skin is not jaundiced. The skin on both feet is very normal.  Minor abnormality of left toenails. Left thumb, middle finger, and ring finger fingernails are quite deformed. On the right hand the middle finger nail is deformed and appears to possibly have onychomycosis. The left hand palm has a lot of dryness and mild erythema of the skin. The right fingers are affected similarly to the left hand but the right hand has less involvement. There is some scarring of the right lower shin. Neurological:      Mental Status: She is alert. Psychiatric:         Behavior: Behavior normal.         Thought Content: Thought content normal.         Judgment: Judgment normal.    Reviewed BMP and CMP dated 12/27/2021. The anemia had resolved and hemoglobin was 14.2. Of note however is that the white blood cell count was up to 14.9. The acute kidney failure had resolved and GFR was greater than 60. ASSESSMENT/PLAN:    1.  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 He does have a pending appointment with an orthopedic doctor within the next week or 2. Orders:  -     doxycycline hyclate (VIBRA-TABS) 100 MG tablet; Take 1 tablet by mouth 2 times daily, Disp-30 tablet, R-0Normal  -     External Referral To Infectious Disease  -     [address]  2. Atherosclerosis of coronary artery of native heart without angina pectoris, unspecified vessel or lesion type  Assessment & Plan:   Under care of cardiologist who has told him he needs yearly follow-ups. 3. Smoker  Assessment & Plan:   I stressed the importance of smoking cessation for lower extremity circulation so that he has the best chance of healing from the infection. He is willing to try and I have entered a prescription for bupropion to help with quitting smoking. Orders:  -     buPROPion (ZYBAN) 150 MG extended release tablet; Take 1 tablet by mouth 2 times daily, Disp-60 tablet, R-0Normal  4. Cardiomyopathy, unspecified type [facility])  Assessment & Plan:   He states that his cardiologist has told him he only needs yearly follow-ups. Orders:  -     Comprehensive Metabolic Panel  5. Cardiac arrest [facility])  Assessment & Plan:   He states that his cardiologist has cleared him and told him he only needs yearly follow-ups. 6. Acute non-ST segment elevation myocardial infarction [facility])  Assessment & Plan:   Cardiology care with yearly follow-ups. Orders:  -     Lipid Panel  7. Acute kidney injury [facility])  Assessment & Plan:   I will check a CMP to evaluate his current kidney status. Orders:  -     Comprehensive Metabolic Panel  8. History of anemia  Assessment & Plan:   I will check a CBC to evaluate the current status of anemia. Orders:  -     CBC Auto Differential  -     Iron and TIBC  9.  Nail abnormalities  Assessment & Plan:   Refer to a dermatologist to evaluate the nail abnormalities and skin abnormalities of the hands. I will also order an iron level. Orders:  -     Iron and TIBC  -     Amb External Referral To Dermatology  10. Right sided numbness  -     [facility] Neurology  11. Need for hepatitis C screening test  -     Hepatitis C Antibody  12. Screening for HIV (human immunodeficiency virus)  -     HIV Screen  13. Screening for diabetes mellitus  -     Hemoglobin A1C  14. Leukocytosis, unspecified type  -     CBC Auto Differential        Counseling provided for:    >> Exercise - 1> try to do 150 minutes a week of exercise that is as hard as walking briskly (30 minutes 5 days a week or 22 minutes every day); and 2> do some strength training 2 or 3 times a week. Recommended smoking cessation. Return in about 4 weeks (around 1/31/2022) for [address] [place] [address].      Cathryn Guevara MD

## 2022-01-03 NOTE — ASSESSMENT & PLAN NOTE
I stressed the importance of smoking cessation for lower extremity circulation so that he has the best chance of healing from the infection. He is willing to try and I have entered a prescription for bupropion to help with quitting smoking.

## 2022-01-04 ENCOUNTER — TELEPHONE (OUTPATIENT)
Dept: FAMILY MEDICINE CLINIC | Age: 47
End: 2022-01-04

## 2022-01-04 ENCOUNTER — TELEPHONE (OUTPATIENT)
Dept: ORTHOPEDIC SURGERY | Age: 47
End: 2022-01-04

## 2022-01-04 PROBLEM — D50.9 IRON DEFICIENCY ANEMIA: Status: ACTIVE | Noted: 2022-01-04

## 2022-01-04 LAB
A/G RATIO: 1.7 (ref 1.1–2.2)
ALBUMIN SERPL-MCNC: 4.6 G/DL (ref 3.4–5)
ALP BLD-CCNC: 86 U/L (ref 40–129)
ALT SERPL-CCNC: 12 U/L (ref 10–40)
ANION GAP SERPL CALCULATED.3IONS-SCNC: 14 MMOL/L (ref 3–16)
AST SERPL-CCNC: 17 U/L (ref 15–37)
BILIRUB SERPL-MCNC: <0.2 MG/DL (ref 0–1)
BUN BLDV-MCNC: 14 MG/DL (ref 7–20)
CALCIUM SERPL-MCNC: 9.4 MG/DL (ref 8.3–10.6)
CHLORIDE BLD-SCNC: 105 MMOL/L (ref 99–110)
CHOLESTEROL, TOTAL: 186 MG/DL (ref 0–199)
CO2: 25 MMOL/L (ref 21–32)
CREAT SERPL-MCNC: 0.6 MG/DL (ref 0.9–1.3)
ESTIMATED AVERAGE GLUCOSE: 114 MG/DL
GFR AFRICAN AMERICAN: >60
GFR NON-AFRICAN AMERICAN: >60
GLUCOSE BLD-MCNC: 87 MG/DL (ref 70–99)
HBA1C MFR BLD: 5.6 %
HDLC SERPL-MCNC: 41 MG/DL (ref 40–60)
HIV AG/AB: NORMAL
HIV ANTIGEN: NORMAL
HIV-1 ANTIBODY: NORMAL
HIV-2 AB: NORMAL
IRON SATURATION: 9 % (ref 20–50)
IRON: 33 UG/DL (ref 59–158)
LDL CHOLESTEROL CALCULATED: 117 MG/DL
POTASSIUM SERPL-SCNC: 4.1 MMOL/L (ref 3.5–5.1)
SODIUM BLD-SCNC: 144 MMOL/L (ref 136–145)
TOTAL IRON BINDING CAPACITY: 368 UG/DL (ref 260–445)
TOTAL PROTEIN: 7.3 G/DL (ref 6.4–8.2)
TRIGL SERPL-MCNC: 142 MG/DL (ref 0–150)
VLDLC SERPL CALC-MCNC: 28 MG/DL

## 2022-01-04 RX ORDER — FERROUS SULFATE 325(65) MG
325 TABLET ORAL
Qty: 30 TABLET | Refills: 5 | Status: SHIPPED | OUTPATIENT
Start: 2022-01-04 | End: 2022-06-14

## 2022-01-04 RX ORDER — BUPROPION HYDROCHLORIDE 300 MG/1
300 TABLET ORAL EVERY MORNING
Qty: 30 TABLET | Refills: 2 | Status: SHIPPED | OUTPATIENT
Start: 2022-01-04 | End: 2022-02-22

## 2022-01-04 RX ORDER — ASCORBIC ACID 250 MG
250 TABLET ORAL DAILY
Qty: 30 TABLET | Refills: 3 | Status: SHIPPED | OUTPATIENT
Start: 2022-01-04 | End: 2022-06-14

## 2022-01-04 NOTE — TELEPHONE ENCOUNTER
Spoke with Dr. Lisa Hilario and he is not comfortable with the second opinion. Patient is needing to return back to [facility] (Dr Kei Mckeon or Dr. Paige Mathews).  Or recommend patient being seen by a reversion specialist in [place]

## 2022-01-04 NOTE — TELEPHONE ENCOUNTER
Cole Butler from Ortho called stating that patient was seen by a orthopedic doctor 6 months ago and Dr. Aki Brandt will not see patient.  Stated they cannot amputate his leg and he either needs to go to [facility] or a revision Specialist.

## 2022-01-04 NOTE — TELEPHONE ENCOUNTER
Please go ahead and change the referral to one of the specialists that was recommended.   If you need me to do something in the computer please let me know thanks

## 2022-01-05 DIAGNOSIS — F17.200 SMOKER: ICD-10-CM

## 2022-01-11 ENCOUNTER — TELEPHONE (OUTPATIENT)
Dept: FAMILY MEDICINE CLINIC | Age: 47
End: 2022-01-11

## 2022-01-11 NOTE — TELEPHONE ENCOUNTER
Patient called stating his Orthopedic doctor with [facility] is telling him that they need you to take over treating his chronic pain. Patient stated he is willing to do what ever you want including cortisone shots if that is what you want.

## 2022-01-11 NOTE — TELEPHONE ENCOUNTER
I would be happy to do what I can do but I will likely not want to prescribe opioids. I would want an appointment to talk through the options.

## 2022-01-12 ENCOUNTER — OFFICE VISIT (OUTPATIENT)
Dept: FAMILY MEDICINE CLINIC | Age: 47
End: 2022-01-12
Payer: MEDICAID

## 2022-01-12 VITALS
SYSTOLIC BLOOD PRESSURE: 132 MMHG | BODY MASS INDEX: 30.07 KG/M2 | TEMPERATURE: 97.3 F | WEIGHT: 215.6 LBS | OXYGEN SATURATION: 97 % | DIASTOLIC BLOOD PRESSURE: 80 MMHG | HEART RATE: 82 BPM

## 2022-01-12 DIAGNOSIS — R20.0 RIGHT SIDED NUMBNESS: ICD-10-CM

## 2022-01-12 DIAGNOSIS — F43.21 SITUATIONAL DEPRESSION: ICD-10-CM

## 2022-01-12 DIAGNOSIS — M86.9: Primary | ICD-10-CM

## 2022-01-12 DIAGNOSIS — F32.89 OTHER DEPRESSION: ICD-10-CM

## 2022-01-12 PROBLEM — F32.1 CURRENT MODERATE EPISODE OF MAJOR DEPRESSIVE DISORDER WITHOUT PRIOR EPISODE (HCC): Status: ACTIVE | Noted: 2022-01-12

## 2022-01-12 PROCEDURE — 4004F PT TOBACCO SCREEN RCVD TLK: CPT | Performed by: FAMILY MEDICINE

## 2022-01-12 PROCEDURE — 99215 OFFICE O/P EST HI 40 MIN: CPT | Performed by: FAMILY MEDICINE

## 2022-01-12 PROCEDURE — G8484 FLU IMMUNIZE NO ADMIN: HCPCS | Performed by: FAMILY MEDICINE

## 2022-01-12 PROCEDURE — G8427 DOCREV CUR MEDS BY ELIG CLIN: HCPCS | Performed by: FAMILY MEDICINE

## 2022-01-12 PROCEDURE — G8417 CALC BMI ABV UP PARAM F/U: HCPCS | Performed by: FAMILY MEDICINE

## 2022-01-12 RX ORDER — DICLOFENAC SODIUM 75 MG/1
75 TABLET, DELAYED RELEASE ORAL 2 TIMES DAILY PRN
Qty: 60 TABLET | Refills: 1 | Status: SHIPPED | OUTPATIENT
Start: 2022-01-12 | End: 2022-02-22 | Stop reason: SDUPTHER

## 2022-01-12 SDOH — ECONOMIC STABILITY: FOOD INSECURITY: WITHIN THE PAST 12 MONTHS, YOU WORRIED THAT YOUR FOOD WOULD RUN OUT BEFORE YOU GOT MONEY TO BUY MORE.: NEVER TRUE

## 2022-01-12 SDOH — ECONOMIC STABILITY: FOOD INSECURITY: WITHIN THE PAST 12 MONTHS, THE FOOD YOU BOUGHT JUST DIDN'T LAST AND YOU DIDN'T HAVE MONEY TO GET MORE.: NEVER TRUE

## 2022-01-12 ASSESSMENT — PATIENT HEALTH QUESTIONNAIRE - PHQ9
SUM OF ALL RESPONSES TO PHQ QUESTIONS 1-9: 0
SUM OF ALL RESPONSES TO PHQ QUESTIONS 1-9: 0
1. LITTLE INTEREST OR PLEASURE IN DOING THINGS: 0
SUM OF ALL RESPONSES TO PHQ9 QUESTIONS 1 & 2: 0
SUM OF ALL RESPONSES TO PHQ QUESTIONS 1-9: 0
2. FEELING DOWN, DEPRESSED OR HOPELESS: 0
SUM OF ALL RESPONSES TO PHQ QUESTIONS 1-9: 0

## 2022-01-12 ASSESSMENT — SOCIAL DETERMINANTS OF HEALTH (SDOH): HOW HARD IS IT FOR YOU TO PAY FOR THE VERY BASICS LIKE FOOD, HOUSING, MEDICAL CARE, AND HEATING?: NOT HARD AT ALL

## 2022-01-12 NOTE — PROGRESS NOTES
1/12/22    Kelley Swann  1975    ASUNCION    HPI - Aide Aguilera is a 42 y.o. male who presents today for evaluation of:  Chief Complaint   Patient presents with    Pain     Right leg infection for which [facility] Doctors have a plan that might be the last option before amputation. He has chronic pain rt lower leg. There is ongoing infection there. He recently used the last 2 pills of oxycodone from #40 oxycodone. Currently taking Aleve APAP and ASA. He has tried gabapentin which did not help. Mood : He is feeling down because of all his health trouble. He feels he may not be able to provide for family and it is extra worry that makes him feel down. He feels he lacks being functional at home. 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 Lacks energy  Sleep is disrupted due to sleep. No change in appetite. Numbness : Numbness Rt foot and post thigh and scrotum and scalp. When son massages rt foot it initially bothers him a lot but with time it is quite soothing. Review of Systems   Neurological: Negative for tremors, seizures, light-headedness and headaches. Psychiatric/Behavioral: Positive for dysphoric mood. Negative for confusion, decreased concentration, hallucinations and suicidal ideas. The patient is nervous/anxious. No Known Allergies     OBJECTIVE    /80 (Site: Right Upper Arm, Position: Sitting, Cuff Size: Large Adult)   Pulse 82   Temp 97.3 °F (36.3 °C) (Infrared)   Wt 215 lb 9.6 oz (97.8 kg)   SpO2 97%   BMI 30.07 kg/m²     Physical Exam   Constitutional:       General: Not in acute distress. Appearance: Normal appearance. Not ill-appearing. Eyes:      General: No scleral icterus. Cardiovascular:      Rate and Rhythm: Normal rate and regular rhythm. Heart sounds: No murmur heard. No friction rub. No gallop. Pulmonary:      Effort: Pulmonary effort is normal. No respiratory distress. Breath sounds: No wheezing, rhonchi or rales. Abdominal:      Palpations: Abdomen is soft. There is no mass. Tenderness: There is no abdominal tenderness. Musculoskeletal:     Moves all extremities normally. Skin:     General: Skin is warm. Coloration: Skin is not jaundiced. Neurological:      Mental Status: She is alert. Psychiatric:         Behavior: Behavior normal.         Thought Content: Thought content normal.         Judgment: Judgment normal.    Reviewed [facility] and Joint surgeons progress note dated 1/5/2022. Reviewed 12/29/21 MRI of tib/fib on Care Everywhere. ASSESSMENT/PLAN:    1. Infection involving bone disease, lower leg ([address]  Assessment & Plan:   He is wanting additional opinions regarding the management of an infected right leg. Since amputation was proposed by his orthopedic surgeons I have referred him to the [facility] orthopedic surgeon that specializes in infections Magda Ortiz. I also referred him to the pain clinic in [place] for management of the chronic pain that he is having in the right lower leg. I prescribed diclofenac to see if it would be helpful. I reviewed maximum recommended doses of ibuprofen and acetaminophen. I instructed him not to take the ibuprofen if he is taking the diclofenac and he understands this. Orders:  -     External Referral To Orthopedic Surgery  -     Amb External Referral To Pain Clinic  -     diclofenac (VOLTAREN) 75 MG EC tablet; Take 1 tablet by mouth 2 times daily as needed for Pain, Disp-60 tablet, R-1Normal  2. Other depression  Assessment & Plan:   I believe this depression is situational and do not think he meets 5 criteria at that would make a diagnosis of major depressive disorder. He is not suicidal.  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 Nevertheless I think an SSRI may help and I will prescribe sertraline. Orders:  -     sertraline (ZOLOFT) 50 MG tablet; Take 1 tablet by mouth daily, Disp-30 tablet, R-1Normal  3.  Right sided numbness  Assessment & Plan:   His right-sided numbness of multiple parts of the body including scalp upper body scrotum and right leg. I gave him the phone number for neurology so that he can schedule the referral that was placed at last visit. 4. Situational depression  Assessment & Plan:   I believe this depression is situational and do not think he meets 5 criteria at that would make a diagnosis of major depressive disorder. He is not suicidal.  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 Nevertheless I think an SSRI may help and I will prescribe sertraline. 46 minutes were spent this calendar day in pre-charting and chart review; with the patient doing history, exam, medical decision making, and counseling; and completing orders and documentation. Note that there were 53 minutes on the clock and I subtracted 7 minutes because some of the time was spent trying to set up a preference in the electronic health record. I am sure that less than 7 minutes was spent doing that. No follow-ups on file.      Star Vincent MD

## 2022-01-12 NOTE — ASSESSMENT & PLAN NOTE
I believe this depression is situational and do not think he meets 5 criteria at that would make a diagnosis of major depressive disorder. He is not suicidal.  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 Nevertheless I think an SSRI may help and I will prescribe sertraline.

## 2022-01-12 NOTE — ASSESSMENT & PLAN NOTE
His right-sided numbness of multiple parts of the body including scalp upper body scrotum and right leg. I gave him the phone number for neurology so that he can schedule the referral that was placed at last visit.

## 2022-01-12 NOTE — ASSESSMENT & PLAN NOTE
He is wanting additional opinions regarding the management of an infected right leg. Since amputation was proposed by his orthopedic surgeons I have referred him to the [facility] orthopedic surgeon that specializes in infections Jyoti Conway. I also referred him to the pain clinic in [facility] for management of the chronic pain that he is having in the right lower leg. I prescribed diclofenac to see if it would be helpful. I reviewed maximum recommended doses of ibuprofen and acetaminophen. I instructed him not to take the ibuprofen if he is taking the diclofenac and he understands this.

## 2022-01-18 ENCOUNTER — TELEPHONE (OUTPATIENT)
Dept: FAMILY MEDICINE CLINIC | Age: 47
End: 2022-01-18

## 2022-01-18 DIAGNOSIS — R51.9 NONINTRACTABLE HEADACHE, UNSPECIFIED CHRONICITY PATTERN, UNSPECIFIED HEADACHE TYPE: Primary | ICD-10-CM

## 2022-01-18 NOTE — TELEPHONE ENCOUNTER
Patient is requesting a COVID test. Starting today he has had a severe headache, runny nose and cough.

## 2022-01-18 NOTE — TELEPHONE ENCOUNTER
I'll put in a covid test, but I would not trust a negative  result until 3-5 days after illness started.

## 2022-02-01 DIAGNOSIS — T84.7XXS INFECTED HARDWARE IN RIGHT LEG, SEQUELA: ICD-10-CM

## 2022-02-01 RX ORDER — DOXYCYCLINE HYCLATE 100 MG
100 TABLET ORAL 2 TIMES DAILY
Qty: 30 TABLET | Refills: 0 | Status: SHIPPED | OUTPATIENT
Start: 2022-02-01 | End: 2022-02-22 | Stop reason: ALTCHOICE

## 2022-02-16 ENCOUNTER — HOSPITAL ENCOUNTER (OUTPATIENT)
Age: 47
Setting detail: SPECIMEN
Discharge: HOME | End: 2022-02-16
Payer: MEDICAID

## 2022-02-16 ENCOUNTER — OFFICE VISIT (OUTPATIENT)
Dept: INFECTIOUS DISEASES | Age: 47
End: 2022-02-16
Payer: MEDICAID

## 2022-02-16 VITALS
TEMPERATURE: 97.3 F | BODY MASS INDEX: 31.16 KG/M2 | DIASTOLIC BLOOD PRESSURE: 67 MMHG | SYSTOLIC BLOOD PRESSURE: 115 MMHG | RESPIRATION RATE: 18 BRPM | WEIGHT: 223.4 LBS | HEART RATE: 75 BPM

## 2022-02-16 DIAGNOSIS — T84.7XXS INFECTED HARDWARE IN RIGHT LEG, SEQUELA: Primary | ICD-10-CM

## 2022-02-16 PROCEDURE — 4004F PT TOBACCO SCREEN RCVD TLK: CPT | Performed by: INTERNAL MEDICINE

## 2022-02-16 PROCEDURE — G8417 CALC BMI ABV UP PARAM F/U: HCPCS | Performed by: INTERNAL MEDICINE

## 2022-02-16 PROCEDURE — 87081 CULTURE SCREEN ONLY: CPT

## 2022-02-16 PROCEDURE — 99204 OFFICE O/P NEW MOD 45 MIN: CPT | Performed by: INTERNAL MEDICINE

## 2022-02-16 PROCEDURE — G8484 FLU IMMUNIZE NO ADMIN: HCPCS | Performed by: INTERNAL MEDICINE

## 2022-02-16 PROCEDURE — G8427 DOCREV CUR MEDS BY ELIG CLIN: HCPCS | Performed by: INTERNAL MEDICINE

## 2022-02-16 RX ORDER — HYDROMORPHONE HYDROCHLORIDE 4 MG/1
4 TABLET ORAL 3 TIMES DAILY
COMMUNITY

## 2022-02-16 NOTE — PROGRESS NOTES
2/16/2022         Referring Physician: No ref. provider found  Primary Care Physician: Carlene Mtz MD    Impression/Plan:   Diagnosis Orders   1. 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 Advise bacterial, fungal and AFB culture when specimen is sent. Return in about 2 weeks (around 3/2/2022). History: Hayde Perez is a 42 y.o.  male presenting today for right leg hardware associated osteomyelitis. Shattered his right tibia in April 2021. Was on doxycycline, but stopped last week because he has surgery scheduled for 2/26/2022. Initial surgery was done in [facility] and the other 4 surgery was done in [address] [place] [address]. External fixation was done in April 2021, and that was for a month. Then is was opened and he had an ORIF. He had nail in the fibula, plates and screw tibia. It took 4-6 weeks for the skin to heal. Hardware failure in July 2021. Removal of hardware in 8/2021. No known cultures were done. Continues to have pain. Denies n/v/d/f. Review of Systems   All other systems reviewed and are negative.       No Known Allergies    Patient Active Problem List   Diagnosis    Drug overdose, accidental or unintentional, initial encounter    Acute kidney [facility] ([facility] [address]    Acute non-ST segment elevation myocardial infarction [facility])    Cardiac arrest ([facility] [address]    Cardiac murmur, unspecified    Cardiomyopathy ([facility] [address]    Chronic ischemic heart disease    Non-traumatic rhabdomyolysis    Smoker    History of anemia    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  diclofenac (VOLTAREN) 75 MG EC tablet Take 1 tablet by mouth 2 times daily as needed for Pain 60 tablet 1    sertraline (ZOLOFT) 50 MG tablet Take 1 tablet by mouth daily 30 tablet 1    ferrous sulfate (IRON 325) 325 (65 Fe) MG tablet Take 1 tablet by mouth daily (with breakfast) 30 tablet 5    ascorbic acid (V-R VITAMIN C) 250 MG tablet Take 1 tablet by mouth daily 30 tablet 3    buPROPion (WELLBUTRIN XL) 300 MG extended release tablet Take 1 tablet by mouth every morning 30 tablet 2    doxycycline hyclate (VIBRA-TABS) 100 MG tablet Take 1 tablet by mouth 2 times daily Be sure you are seeing an orthopedic specialist. (Patient not taking: Reported on 2/16/2022) 30 tablet 0     No current facility-administered medications for this visit.        Past Medical History:   Diagnosis Date    Fibula fracture     Hypertension     Tibia fracture        Past Surgical History:   Procedure Laterality Date    FRACTURE SURGERY      right leg     FRACTURE SURGERY         Social History     Socioeconomic History    Marital status:      Spouse name: Not on file    Number of children: Not on file    Years of education: Not on file    Highest education level: Not on file   Occupational History    Not on file   Tobacco Use    Smoking status: Current Every Day Smoker     Packs/day: 1.00    Smokeless tobacco: Never Used   Substance and Sexual Activity    Alcohol use: Yes     Comment: social    Drug use: Yes     Types: Marijuana (Madhu Gain), Opiates     Sexual activity: Yes     Partners: Female   Other Topics Concern    Not on file   Social History Narrative    Not on file     Social Determinants of Health     Financial Resource Strain: Low Risk     Difficulty of Paying Living Expenses: Not hard at all   Food Insecurity: No Food Insecurity    Worried About [address] [employer] in the Last Year: Never true    [address] [facility] [address] in the Last Year: Never true   Transportation Needs:     Lack of Transportation (Medical): Not on file    Lack of Transportation (Non-Medical): Not on file   Physical Activity:     Days of Exercise per Week: Not on file    Minutes of Exercise per Session: Not on file   Stress:     Feeling of Stress : Not on file   Social Connections:     Frequency of Communication with Friends and Family: Not on file    Frequency of Social Gatherings with Friends and Family: Not on file    Attends Yazidi Services: Not on file    Active Member of [address] [place] [address] or Organizations: Not on file    Attends Club or Organization Meetings: Not on file    Marital Status: Not on file   Intimate Partner Violence:     Fear of Current or Ex-Partner: Not on file    Emotionally Abused: Not on file    Physically Abused: Not on file    Sexually Abused: Not on file   Housing Stability:     Unable to Pay for Housing in the Last Year: Not on file    Number of [place] in the Last Year: Not on file    Unstable Housing in the Last Year: Not on file       No family history on file.     Vital Signs:  Vitals:    02/16/22 1158   BP: 115/67   Site: Right Upper Arm   Position: Sitting   Cuff Size: Medium Adult   Pulse: 75   Resp: 18   Temp: 97.3 °F (36.3 °C)   TempSrc: Infrared   Weight: 223 lb 6.4 oz (101.3 kg)        Wt Readings from Last 3 Encounters:   02/16/22 223 lb 6.4 oz (101.3 kg)   01/12/22 215 lb 9.6 oz (97.8 kg)   01/03/22 215 lb 6.4 oz (97.7 kg)        Physical Exam:   Gen: alert and NAD  HEENT: sclera clear, pupils equal and reactive, extra ocular muscles intact, oropharynx clear, mucus membranes moist, tympanic membranes clear bilaterally, no cervical lymphadenopathy noted and neck supple  Neck: supple, no significant adenopathy  Chest: clear to auscultation, no wheezes, rales or rhonchi, symmetric air entry  Heart: regular rate and rhythm, no murmurs  ABD: abdomen is soft without significant tenderness, masses, organomegaly or guarding.   EXT:peripheral pulses normal, no pedal edema, no clubbing or cyanosis  NEURO: alert, oriented, normal speech, no focal findings or movement disorder noted  Skin: well hydrated, no lesions, surgical site examined  Wounds: none  Labs:   WBC   Date Value Ref Range Status   01/03/2022 9.5 4.0 - 11.0 K/uL Final   12/27/2021 14.9 (H) 4.0 - 10.5 K/CU MM Final   08/30/2021 11.3 (H) 4.0 - 10.5 K/CU MM Final     CREATININE   Date Value Ref Range Status   01/03/2022 0.6 (L) 0.9 - 1.3 mg/dL Final   12/27/2021 0.6 (L) 0.9 - 1.3 MG/DL Final   08/30/2021 0.8 (L) 0.9 - 1.3 MG/DL Final     POC Creatinine   Date Value Ref Range Status   12/27/2021 0.6 (L) 0.9 - 1.3 MG/DL Final       Cultures:  No results found for: CULTURE    Imaging Studies:         Electronicallysigned by Ximena Cortez MD on 2/16/22 at 6:30 AM EST

## 2022-02-17 NOTE — ASSESSMENT & PLAN NOTE
No microbiologic diagnosis available. Advise bacterial, fungal and AFB culture when specimen is sent.

## 2022-02-18 LAB
CULTURE: NORMAL
Lab: NORMAL
SPECIMEN: NORMAL

## 2022-02-22 ENCOUNTER — OFFICE VISIT (OUTPATIENT)
Dept: FAMILY MEDICINE CLINIC | Age: 47
End: 2022-02-22
Payer: MEDICAID

## 2022-02-22 VITALS
BODY MASS INDEX: 31.16 KG/M2 | DIASTOLIC BLOOD PRESSURE: 80 MMHG | SYSTOLIC BLOOD PRESSURE: 118 MMHG | OXYGEN SATURATION: 96 % | TEMPERATURE: 97.9 F | RESPIRATION RATE: 16 BRPM | HEIGHT: 71 IN | WEIGHT: 222.6 LBS | HEART RATE: 81 BPM

## 2022-02-22 DIAGNOSIS — F17.200 SMOKER: Primary | ICD-10-CM

## 2022-02-22 DIAGNOSIS — M86.9: ICD-10-CM

## 2022-02-22 PROCEDURE — G8484 FLU IMMUNIZE NO ADMIN: HCPCS | Performed by: FAMILY MEDICINE

## 2022-02-22 PROCEDURE — 99213 OFFICE O/P EST LOW 20 MIN: CPT | Performed by: FAMILY MEDICINE

## 2022-02-22 PROCEDURE — G8427 DOCREV CUR MEDS BY ELIG CLIN: HCPCS | Performed by: FAMILY MEDICINE

## 2022-02-22 PROCEDURE — G8417 CALC BMI ABV UP PARAM F/U: HCPCS | Performed by: FAMILY MEDICINE

## 2022-02-22 PROCEDURE — 4004F PT TOBACCO SCREEN RCVD TLK: CPT | Performed by: FAMILY MEDICINE

## 2022-02-22 RX ORDER — NORTRIPTYLINE HYDROCHLORIDE 25 MG/1
25 CAPSULE ORAL NIGHTLY
Qty: 30 CAPSULE | Refills: 3 | Status: SHIPPED | OUTPATIENT
Start: 2022-02-22 | End: 2022-06-14

## 2022-02-22 RX ORDER — DICLOFENAC SODIUM 75 MG/1
75 TABLET, DELAYED RELEASE ORAL 2 TIMES DAILY PRN
Qty: 60 TABLET | Refills: 1 | Status: SHIPPED | OUTPATIENT
Start: 2022-02-22 | End: 2022-06-14

## 2022-02-22 RX ORDER — NICOTINE 21 MG/24HR
1 PATCH, TRANSDERMAL 24 HOURS TRANSDERMAL DAILY
Qty: 28 PATCH | Refills: 2 | Status: SHIPPED | OUTPATIENT
Start: 2022-02-22 | End: 2022-06-14

## 2022-02-22 ASSESSMENT — ENCOUNTER SYMPTOMS
DIARRHEA: 0
CONSTIPATION: 0
VOMITING: 0
ABDOMINAL PAIN: 0
COUGH: 0

## 2022-02-22 NOTE — ASSESSMENT & PLAN NOTE
Strongly encouraged smoking cessation. I will prescribe nicotine patches as well as nortriptyline at bedtime. The nortriptyline should also help with his insomnia.

## 2022-02-22 NOTE — PROGRESS NOTES
2/22/22    Nathaniel Rhona  1975    SUBJECTIVE    HPI - Page Corazon is a 42 y.o. male who presents today for evaluation of:  Chief Complaint   Patient presents with    Follow-up     lower right leg infection     Mood : sertraline is not helping. He does not feel depressed. RT leg has an infection. Will get culture tomorrow. Insomnia: He has trouble getting restful sleep. He would like a med to help. +fatigue     Smoker : He did not think the pills helped. Review of Systems   Constitutional: Positive for fatigue. Negative for fever. Respiratory: Negative for cough. Cardiovascular: Positive for leg swelling (rt infected leg. ). Negative for chest pain. Gastrointestinal: Negative for abdominal pain, constipation, diarrhea and vomiting. No Known Allergies     OBJECTIVE    /80 (Site: Left Upper Arm, Position: Sitting, Cuff Size: Medium Adult)   Pulse 81   Temp 97.9 °F (36.6 °C) (Infrared)   Resp 16   Ht 5' 11\" (1.803 m)   Wt 222 lb 9.6 oz (101 kg)   SpO2 96%   BMI 31.05 kg/m²     Physical Exam   Constitutional:       General: Not in acute distress. Appearance: Normal appearance. Not ill-appearing. Eyes:      General: No scleral icterus. Cardiovascular:      Rate and Rhythm: Normal rate and regular rhythm. Heart sounds: No murmur heard. No friction rub. No gallop. Pulmonary:      Effort: Pulmonary effort is normal. No respiratory distress. Breath sounds: No wheezing, rhonchi or rales. Abdominal:      Palpations: Abdomen is soft. There is no mass. Tenderness: There is no abdominal tenderness. Musculoskeletal:     Moves all extremities normally. Skin:     General: Skin is warm. Coloration: Skin is not jaundiced. Neurological:      Mental Status: Patient is alert. Psychiatric:         Behavior: Behavior normal.         Thought Content: Thought content normal.         Judgment: Judgment normal.      ASSESSMENT/PLAN:    1.  Smoker  Assessment & Plan:   Strongly encouraged smoking cessation. I will prescribe nicotine patches as well as nortriptyline at bedtime. The nortriptyline should also help with his insomnia. Orders:  -     nortriptyline (PAMELOR) 25 MG capsule; Take 1 capsule by mouth nightly, Disp-30 capsule, R-3Normal  -     nicotine (NICODERM CQ) 21 MG/24HR; Place 1 patch onto the skin daily, Disp-28 patch, R-2Normal  2. Infection involving bone disease, lower leg (HCC)  Assessment & Plan:   Continue to prescribe Voltaren to help with the pain from his infected right leg. Orders:  -     diclofenac (VOLTAREN) 75 MG EC tablet; Take 1 tablet by mouth 2 times daily as needed for Pain, Disp-60 tablet, R-1Normal        Counseling provided for:    >> Exercise - 1> try to do 150 minutes a week of exercise that is as hard as walking briskly (30 minutes 5 days a week or 22 minutes every day); and 2> do some strength training 2 or 3 times a week. Recommended smoking cessation. Return in about 6 weeks (around 4/5/2022) for WAEL and insomnia .      Latesha Munguia MD

## 2022-02-22 NOTE — PATIENT INSTRUCTIONS
Patient Education        Stopping Smoking: Care Instructions  Your Care Instructions     Cigarette smokers crave the nicotine in cigarettes. Giving it up is much harder than simply changing a habit. Your body has to stop craving the nicotine. It is hard to quit, but you can do it. There are many tools that people use to quit smoking. You may find that combining tools works best for you. There are several steps to quitting. First you get ready to quit. Then you get support to help you. After that, you learn new skills and behaviors to become a nonsmoker. For many people, a necessary step is getting and using medicine. Your doctor will help you set up the plan that best meets your needs. You may want to attend a smoking cessation program to help you quit smoking. When you choose a program, look for one that has proven success. Ask your doctor for ideas. You will greatly increase your chances of success if you take medicine as well as get counseling or join a cessation program.  Some of the changes you feel when you first quit tobacco are uncomfortable. Your body will miss the nicotine at first, and you may feel short-tempered and grumpy. You may have trouble sleeping or concentrating. Medicine can help you deal with these symptoms. You may struggle with changing your smoking habits and rituals. The last step is the tricky one: Be prepared for the smoking urge to continue for a time. This is a lot to deal with, but keep at it. You will feel better. Follow-up care is a key part of your treatment and safety. Be sure to make and go to all appointments, and call your doctor if you are having problems. It's also a good idea to know your test results and keep a list of the medicines you take. How can you care for yourself at home? · Ask your family, friends, and coworkers for support. You have a better chance of quitting if you have help and support.   · Join a support group, such as Nicotine Anonymous, for people who are trying to quit smoking. · Consider signing up for a smoking cessation program, such as the American Lung Association's Freedom from Smoking program.  · Get text messaging support. Go to the website at www.smokefree. gov to sign up for the [facility] program.  · Set a quit date. Pick your date carefully so that it is not right in the middle of a big deadline or stressful time. Once you quit, do not even take a puff. Get rid of all ashtrays and lighters after your last cigarette. Clean your house and your clothes so that they do not smell of smoke. · Learn how to be a nonsmoker. Think about ways you can avoid those things that make you reach for a cigarette. ? Avoid situations that put you at greatest risk for smoking. For some people, it is hard to have a drink with friends without smoking. For others, they might skip a coffee break with coworkers who smoke. ? Change your daily routine. Take a different route to work or eat a meal in a different place. · Cut down on stress. Calm yourself or release tension by doing an activity you enjoy, such as reading a book, taking a hot bath, or gardening. · Talk to your doctor or pharmacist about nicotine replacement therapy, which replaces the nicotine in your body. You still get nicotine but you do not use tobacco. Nicotine replacement products help you slowly reduce the amount of nicotine you need. These products come in several forms, many of them available over-the-counter:  ? Nicotine patches  ? Nicotine gum and lozenges  ? Nicotine inhaler  · Ask your doctor about bupropion (Wellbutrin) or varenicline (Chantix), which are prescription medicines. They do not contain nicotine. They help you by reducing withdrawal symptoms, such as stress and anxiety. · Some people find hypnosis, acupuncture, and massage helpful for ending the smoking habit. · Eat a healthy diet and get regular exercise.  Having healthy habits will help your body move past its craving for nicotine. · Be prepared to keep trying. Most people are not successful the first few times they try to quit. Do not get mad at yourself if you smoke again. Make a list of things you learned and think about when you want to try again, such as next week, next month, or next year. Where can you learn more? Go to https://Logentriespepicewjovanny.VDP. org and sign in to your [employer] account. Enter U952 in the [employer] box to learn more about \"Stopping Smoking: Care Instructions. \"     If you do not have an account, please click on the \"Sign Up Now\" link. Current as of: February 11, 2021               Content Version: 13.1  © 2006-2021 Healthwise, Incorporated. Care instructions adapted under license by [facility] [place] ([facility]). If you have questions about a medical condition or this instruction, always ask your healthcare professional. [address] any warranty or liability for your use of this information.

## 2022-03-15 ENCOUNTER — APPOINTMENT (OUTPATIENT)
Dept: GENERAL RADIOLOGY | Age: 47
End: 2022-03-15
Payer: MEDICAID

## 2022-03-15 ENCOUNTER — HOSPITAL ENCOUNTER (EMERGENCY)
Age: 47
Discharge: HOME | End: 2022-03-15
Attending: EMERGENCY MEDICINE
Payer: MEDICAID

## 2022-03-15 VITALS
SYSTOLIC BLOOD PRESSURE: 132 MMHG | WEIGHT: 235 LBS | TEMPERATURE: 97.2 F | DIASTOLIC BLOOD PRESSURE: 86 MMHG | HEART RATE: 76 BPM | BODY MASS INDEX: 33.64 KG/M2 | RESPIRATION RATE: 15 BRPM | OXYGEN SATURATION: 98 % | HEIGHT: 70 IN

## 2022-03-15 DIAGNOSIS — M25.512 ACUTE PAIN OF LEFT SHOULDER: Primary | ICD-10-CM

## 2022-03-15 PROCEDURE — 99282 EMERGENCY DEPT VISIT SF MDM: CPT

## 2022-03-15 PROCEDURE — 73030 X-RAY EXAM OF SHOULDER: CPT

## 2022-03-15 RX ORDER — METHOCARBAMOL 500 MG/1
500 TABLET, FILM COATED ORAL 3 TIMES DAILY PRN
Qty: 21 TABLET | Refills: 0 | Status: SHIPPED | OUTPATIENT
Start: 2022-03-15 | End: 2022-03-22

## 2022-03-15 NOTE — ED PROVIDER NOTES
EMERGENCY DEPARTMENT ENCOUNTER    Patient: Kar Dumont  MRN: [id number]  : 1975  Date of Evaluation: 3/15/2022  ED Provider:  Rios Viera MD    CHIEF COMPLAINT  Chief Complaint   Patient presents with    Shoulder Pain     left       HPI  Kar Dumont is a 42 y.o. male who presents with moderate severity, constant aching and throbbing left shoulder pain which she feels is within the jaw itself. Has been ongoing for the last several days. No known injuries. Worsened with movement but he still has normal range of motion of the left shoulder. Does not radiate elsewhere. Denies any other associated symptoms or complaints or concerns. REVIEW OF SYSTEMS    Constitutional: negative for fever, chills  Neurological: negative for HA, focal weakness, loss of sensation  Ophthalmic: negative for vision change  ENT: negative for congestion, rhinorrhea  Cardiovascular: negative for chest pain  Respiratory: negative for SOB, cough  GI: negative for abdominal pain, nausea, vomiting, diarrhea, constipation  : negative for dysuria, hematuria  Musculoskeletal: negative for myalgias, decreased ROM, joint swelling  Dermatological: negative for rash, wounds  Heme: Negative for bleeding, bruising      PAST MEDICAL HISTORY  Past Medical History:   Diagnosis Date    Fibula fracture     Hypertension     Tibia fracture        CURRENT MEDICATIONS  [unfilled]    ALLERGIES  No Known Allergies    SURGICAL HISTORY  Past Surgical History:   Procedure Laterality Date    FRACTURE SURGERY      right leg     FRACTURE SURGERY         FAMILY HISTORY  No family history on file.     SOCIAL HISTORY  Social History     Socioeconomic History    Marital status:      Spouse name: Not on file    Number of children: Not on file    Years of education: Not on file    Highest education level: Not on file   Occupational History    Not on file   Tobacco Use    Smoking status: Current Every Day Smoker     Packs/day: 1.00    Smokeless tobacco: Never Used   Substance and Sexual Activity    Alcohol use: Yes     Comment: social    Drug use: Yes     Types: Marijuana ([place]), Opiates     Sexual activity: Yes     Partners: Female   Other Topics Concern    Not on file   Social History Narrative    Not on file     Social Determinants of Health     Financial Resource Strain: Low Risk     Difficulty of Paying Living Expenses: Not hard at all   Food Insecurity: No Food Insecurity    Worried About [facility] in the Last Year: Never true    [facility] in the Last Year: Never true   Transportation Needs:     Lack of Transportation (Medical): Not on file    Lack of Transportation (Non-Medical):  Not on file   Physical Activity:     Days of Exercise per Week: Not on file    Minutes of Exercise per Session: Not on file   Stress:     Feeling of Stress : Not on file   Social Connections:     Frequency of Communication with Friends and Family: Not on file    Frequency of Social Gatherings with Friends and Family: Not on file    Attends [employer] Services: Not on file    Active Member of [address] [place] [address] or Organizations: Not on file    Attends Club or Organization Meetings: Not on file    Marital Status: Not on file   Intimate Partner Violence:     Fear of Current or Ex-Partner: Not on file    Emotionally Abused: Not on file    Physically Abused: Not on file    Sexually Abused: Not on file   Housing Stability:     Unable to Pay for Housing in the Last Year: Not on file    Number of [place] in the Last Year: Not on file    Unstable Housing in the Last Year: Not on file         **Past medical, family and social histories, and nursing notes reviewed and verified by me**      PHYSICAL EXAM  VITAL SIGNS:   ED Triage Vitals   Enc Vitals Group      BP       Pulse       Resp       Temp       Temp src       SpO2       Weight       Height       Head Circumference       Peak Flow       Pain Score       Pain Loc       Pain Edu? Excl. in [address]? Vitals during ED course were reviewed and are as charted. Constitutional: Minimal distress, Non-toxic appearance  Eyes: Conjunctiva normal, No discharge  HENT: Normocephalic, Atraumatic  Neck: Supple, no midline cervical spinal tenderness palpation palpable deformities or step-offs, no stridor, no grossly visible or palpable masses  Cardiovascular: Regular rate and rhythm, No murmurs, No rubs, No gallops  Pulmonary/Chest: Normal breath sounds, No respiratory distress or accessory muscle use, No wheezing, crackles or rhonchi. Abdomen: Soft, nondistended and nonrigid, No tenderness or peritoneal signs, No masses, normal bowel sounds  Back: No midline point tenderness, No paraspinous muscle tenderness. No CVA tenderness  Extremities: Normal range of motion left shoulder. No tenderness to palpation, no gross deformities, no edema, 2+ symmetrical distal pulses  Neurologic: Normal motor function, Normal sensory function, No focal deficits  Skin: Warm, Dry, No erythema, No rash, No cyanosis, No mottling  Psychiatric: Alert and oriented x3, Affect normal              RADIOLOGY/PROCEDURES/LABS/MEDICATIONS ADMINISTERED:    I have reviewed and interpreted all of the currently available lab results from this visit (if applicable):  No results found for this visit on 03/15/22. ABNORMAL LABS:  Labs Reviewed - No data to display      IMAGING STUDIES ORDERED:  XR SHOULDER LEFT (MIN 2 VIEWS)      XR SHOULDER LEFT (MIN 2 VIEWS)   Final Result   No acute abnormality of the left shoulder. MEDICATIONS ADMINISTERED:  Medications - No data to display      [facility]  Last vitals: /86   Pulse 76   Temp 97.2 °F (36.2 °C) (Infrared)   Resp 15   Ht 5' 10\" (1.778 m)   Wt 235 lb (106.6 kg)   SpO2 98%   BMI 33.72 kg/m²     44-year-old male with left shoulder pain. Likely muscular in etiology.   No clinical evidence of suggest acute infectious process and clinically do not suspect referred pain from intrathoracic or intra-abdominal or retroperitoneal source. We will treat with muscle relaxers as he is already on Dilaudid at home and diclofenac. Additional workup and treatment in the ED as documented above. Patient reassured and will be discharged home. I have explained to the patient in appropriate terminology our work-up in the ED and their diagnosis. I have also given anticipatory guidance and expectant management of their condition as an outpatient as per my custom. The patient was given clear discharge and follow-up instructions including return to the ER immediately for worsening concerns. The patient has been advised to follow-up with their primary care physician and/or referred physician in the next two to three days or sooner if worsening and to return to the ER immediately as above with any concerns. I provided the patient counseling with regard to my customary list of strict return precautions as well as return precautions specific to the cause for today's emergency department visit. The patient will return under these provided conditions, but should also return for new concerns or further worsening. Pt and/or family understand and agree with plan. Clinical Impression:  1.  Acute pain of left shoulder        Disposition referral (if applicable):  DO Li Ricks [facility] [address]  [phone]    Schedule an appointment as soon as possible for a visit       [address]  [phone]    If symptoms worsen      Disposition medications (if applicable):  New Prescriptions    METHOCARBAMOL (ROBAXIN) 500 MG TABLET    Take 1 tablet by mouth 3 times daily as needed (muscle spasms)       ED Provider Disposition Time  DISPOSITION            Electronically signed by: Bryan Thompson M.D., 3/15/2022 8:52 AM      This dictation was created with voice recognition software. While attempts have been made to review the dictation as it is transcribed, on occasion the spoken word can be misinterpreted by the technology leading to omissions or inappropriate words, phrases or sentences.         Baron Christopher MD  03/15/22 [id number]

## 2022-03-15 NOTE — ED NOTES
Pt presents to ED with c/o left shoulder pain x 4 days. Pt denies any known injury.      Torrie Lundberg RN  03/15/22 [id number]

## 2022-04-06 ENCOUNTER — OFFICE VISIT (OUTPATIENT)
Dept: NEUROLOGY | Age: 47
End: 2022-04-06
Payer: MEDICAID

## 2022-04-06 VITALS
WEIGHT: 235 LBS | OXYGEN SATURATION: 97 % | BODY MASS INDEX: 33.64 KG/M2 | HEIGHT: 70 IN | HEART RATE: 70 BPM | SYSTOLIC BLOOD PRESSURE: 118 MMHG | DIASTOLIC BLOOD PRESSURE: 80 MMHG

## 2022-04-06 DIAGNOSIS — R44.9 RIGHT-SIDED SENSORY DEFICIT PRESENT: Primary | ICD-10-CM

## 2022-04-06 PROCEDURE — 99243 OFF/OP CNSLTJ NEW/EST LOW 30: CPT | Performed by: STUDENT IN AN ORGANIZED HEALTH CARE EDUCATION/TRAINING PROGRAM

## 2022-04-06 PROCEDURE — G8427 DOCREV CUR MEDS BY ELIG CLIN: HCPCS | Performed by: STUDENT IN AN ORGANIZED HEALTH CARE EDUCATION/TRAINING PROGRAM

## 2022-04-06 PROCEDURE — G8417 CALC BMI ABV UP PARAM F/U: HCPCS | Performed by: STUDENT IN AN ORGANIZED HEALTH CARE EDUCATION/TRAINING PROGRAM

## 2022-04-06 RX ORDER — AMOXICILLIN AND CLAVULANATE POTASSIUM 250; 125 MG/1; MG/1
1 TABLET, FILM COATED ORAL 2 TIMES DAILY
COMMUNITY

## 2022-04-06 NOTE — PROGRESS NOTES
4/6/22    Paul Browne  1975    Chief Complaint   Patient presents with    New Patient     Numbness & tingling on right side of body x11 months. Occured after injury to right leg & cellulitis        Neurologic Consult Note    Subjective    History of Present Illness  Jaime Schumacher is a 42 y.o. male presenting today for neurologic evaluation of right sided paresthesias. He tells me he had an injury 11 months ago (April 2021). He was walking on a window ledge and fell 26 feet breaking his tibia/fibula. He has had 7 surgeries. He developed cellulitis after hardware removal. I am seeing him today for right sided numbness/tingling. After his initial injury he was seen and evaluated in the hospital and then discharged home. He tells me he was later found unconscious laying on his right side by girlfriend. He was taken to [facility]. It was deemed he had a narcotic induced cardiac arrest.  He received Narcan and ROSC was achieved. Following the cardiac arrest he was found to have a significant edema involving his right arm and right leg. There was concern for compartment syndrome so he was frequently being evaluated by orthopedics while he was hospitalized. They did not have to perform a fasciotomy. Eventually, the edema did resolve but the patient noted paresthesias in his right arm and leg. Interestingly, he also reports that he noted paresthesias in his right side of his face. His hospital course was further complicated by acute kidney injury requiring hemodialysis. He tells me that he is now off dialysis and has preserved kidney function. Today, he tells me that he has had persistent right face arm and leg tingling. He describes the paresthesias as being in a patchy distribution. He tells me the paresthesias involve the right scalp starting over the right eyebrow and radiating posteriorly. He also has paresthesias of the right arm from the forearm to the hand.   Lastly he has paresthesias of the proximal right leg including the right testicle. He tells me that he was seen by a neurologist in [place]. At that point time it was recommended he have an EMG, however, he denies getting scheduled for this and never heard from the office any further. He did have a [address] completed in May 2021 which was nonacute. Review of Symptoms:  Neurologic   Symptoms: + difficulty with gait or walking, no bowel symptoms, no vertigo, no confusion, no memory loss, no speech disorder, no visual loss, no double vision, no dizziness, no loss of hearing, +sensory disturbances, + weakness, no headaches, no bladder symptoms, no seizures, no excessive fatigue and no syncope    Current Outpatient Medications   Medication Sig Dispense Refill    amoxicillin-clavulanate (AUGMENTIN) 250-125 MG per tablet Take 1 tablet by mouth 2 times daily      diclofenac (VOLTAREN) 75 MG EC tablet Take 1 tablet by mouth 2 times daily as needed for Pain 60 tablet 1    nortriptyline (PAMELOR) 25 MG capsule Take 1 capsule by mouth nightly 30 capsule 3    nicotine (NICODERM CQ) 21 MG/24HR Place 1 patch onto the skin daily 28 patch 2    HYDROmorphone (DILAUDID) 4 MG tablet Take 4 mg by mouth in the morning, at noon, and at bedtime.  ferrous sulfate (IRON 325) 325 (65 Fe) MG tablet Take 1 tablet by mouth daily (with breakfast) 30 tablet 5    ascorbic acid (V-R VITAMIN C) 250 MG tablet Take 1 tablet by mouth daily 30 tablet 3     No current facility-administered medications for this visit.        Past Medical History:   Diagnosis Date    Fibula fracture     Hypertension     Tibia fracture        Past Surgical History:   Procedure Laterality Date    FRACTURE SURGERY      right leg     FRACTURE SURGERY          Social History     Socioeconomic History    Marital status:      Spouse name: Not on file    Number of children: Not on file    Years of education: Not on file    Highest education level: Not on file   Occupational History    Not on file   Tobacco Use    Smoking status: Current Every Day Smoker     Packs/day: 1.00    Smokeless tobacco: Never Used   Substance and Sexual Activity    Alcohol use: Yes     Comment: social    Drug use: Yes     Types: Marijuana (Conroy Padmaja), Opiates     Sexual activity: Yes     Partners: Female   Other Topics Concern    Not on file   Social History Narrative    Not on file     Social Determinants of Health     Financial Resource Strain: Low Risk     Difficulty of Paying Living Expenses: Not hard at all   Food Insecurity: No Food Insecurity    Worried About [facility] in the Last Year: Never true    [facility] in the Last Year: Never true   Transportation Needs:     Lack of Transportation (Medical): Not on file    Lack of Transportation (Non-Medical): Not on file   Physical Activity:     Days of Exercise per Week: Not on file    Minutes of Exercise per Session: Not on file   Stress:     Feeling of Stress : Not on file   Social Connections:     Frequency of Communication with Friends and Family: Not on file    Frequency of Social Gatherings with Friends and Family: Not on file    Attends Faith Services: Not on file    Active Member of [address] [place] [address] or Organizations: Not on file    Attends Club or Organization Meetings: Not on file    Marital Status: Not on file   Intimate Partner Violence:     Fear of Current or Ex-Partner: Not on file    Emotionally Abused: Not on file    Physically Abused: Not on file    Sexually Abused: Not on file   Housing Stability:     Unable to Pay for Housing in the Last Year: Not on file    Number of [place] in the Last Year: Not on file    Unstable Housing in the Last Year: Not on file       No family history on file.     Objective    Physical Exam:  Also present during visit:None    Constitutional   Weight: well nourished  Heart/Vascular   No cyanosis or clubbing appreciated  Neck   Appearance/Palpation/Auscultation: supple  Mental Status   Orientation: oriented to person, oriented to place, oriented to problem and oriented to time   Mood/Affect: appropriate mood and appropriate affect   Memory/Other: recent memory intact, remote memory intact, fund of knowledge intact, attention span normal and concentration normal  Language   Language: (normal) language, no dysarthria, (normal) articulation and no dysphasia/aphasia  Cranial Nerves   CN II Right: visual fields appear intact   CN II Left: visual fields appear intact   CN III, IV, VI: EOM no nystagmus, normal pursuit and extraocular muscle strength normal   CN III: pupil normal size, pupil reactive to light and dark, pupil accomodates and no ptosis   CN IV: normal   CN VI: normal   CN V Right: normal sensation and muscles of mastication intact - subjective paresthesias to right eyebrow and scalp   CN V Left: normal sensation and muscles of mastication intact   CN VII Right: normal facial expression   CN VII Left: normal facial expression   CN VIII Right: hearing in tact to normal conversation   CN VIII Left: hearing in tact to normal conversation   CN IX,X: normal palatal movement   CN XI Right: normal sternocleidomastoid and normal trapezius   CN XI Left: normal sternocleidomastoid and normal trapezius   CN XII: no tremors of the tongue, no fasciculation of the tongue, tongue protrudes midline, normal power to left and normal power to right  Gait and Stance   Gait/Posture: Ambulates with single leg support walker  Motor/Coordination Exam   General: no bradykinesia, no tremors, no chorea, no athetosis, no myoclonus and no dyskinesia   Right Upper Extremity: normal motor strength, normal bulk and normal tone   Left Upper Extremity: normal motor strength, normal bulk and normal tone   Right Lower Extremity: Moderate weakness, normal bulk and normal tone   Left Lower Extremity: normal motor strength, normal bulk and normal tone   Coordination: no drift, normal finger-to-nose, normal heel-to-shin and rapid alternating movements normal  Reflexes   Reflexes Right: 2/4 biceps, brachioradialis, 1/4 patellar, and achilles    Reflexes Left: 2/4 biceps, brachioradialis, 1/4 patellar, and achilles     Sensory   Sensation: normal light touch, normal pinprick, normal temperature, normal vibration, normal DSS, and no neglect; no objective sensory changes appreciated on examination    Lungs   No auditory wheezing  Skin   Inspection: no jaundice, no lesions, no rashes and no cyanosis      /80 (Site: Right Upper Arm, Position: Sitting, Cuff Size: Medium Adult)   Pulse 70   Ht 5' 10\" (1.778 m)   Wt 235 lb (106.6 kg)   SpO2 97%   BMI 33.72 kg/m²     Assessment and Plan     Diagnosis Orders   1. Right-sided sensory deficit present  MRI BRAIN Delvis Bre was seen today in neurologic consultation regarding right-sided paresthesias noted following cardiac arrest.  Given his paresthesias involve the right face, arm and leg, my biggest concern would be the patient endured a cerebral ischemic insult. As such, I would like to obtain an MRI brain. I will be ordering this with both with and without contrast given he has also had very complicated course with cellulitis of the right lower extremity thus wanting me to also exclude the possibility of cerebral abscess from the differential.  If his MRI brain is unremarkable, then we will proceed with obtaining an EMG of his bilateral upper and lower extremities as given he also had significant edema on the right side following his cardiac arrest, there is potential for compression injury of the peripheral nerves as well. Amanda Collado was agreeable to this plan today. The patient was given time to ask questions, all of which were answered to the best of my abilities. Thank you for allowing me to participate in the care of your patient. Please do not hesitate to contact our office with questions.      Return in about 2 months (around 6/6/2022) for follow up with KIKI.    Joanna Patrick DO

## 2022-04-18 ENCOUNTER — HOSPITAL ENCOUNTER (OUTPATIENT)
Dept: MRI IMAGING | Age: 47
Discharge: HOME | End: 2022-04-18
Payer: MEDICAID

## 2022-04-18 DIAGNOSIS — R44.9 RIGHT-SIDED SENSORY DEFICIT PRESENT: ICD-10-CM

## 2022-04-18 PROCEDURE — 70551 MRI BRAIN STEM W/O DYE: CPT
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
## 2022-06-14 ENCOUNTER — OFFICE VISIT (OUTPATIENT)
Dept: NEUROLOGY | Age: 47
End: 2022-06-14
Payer: MEDICAID

## 2022-06-14 VITALS
HEIGHT: 70 IN | DIASTOLIC BLOOD PRESSURE: 62 MMHG | OXYGEN SATURATION: 94 % | HEART RATE: 72 BPM | BODY MASS INDEX: 33.64 KG/M2 | WEIGHT: 235 LBS | SYSTOLIC BLOOD PRESSURE: 124 MMHG

## 2022-06-14 DIAGNOSIS — R44.9 RIGHT-SIDED SENSORY DEFICIT PRESENT: Primary | ICD-10-CM

## 2022-06-14 DIAGNOSIS — T84.7XXS INFECTED HARDWARE IN RIGHT LEG, SEQUELA: ICD-10-CM

## 2022-06-14 DIAGNOSIS — M86.9: ICD-10-CM

## 2022-06-14 PROCEDURE — G8427 DOCREV CUR MEDS BY ELIG CLIN: HCPCS | Performed by: NURSE PRACTITIONER

## 2022-06-14 PROCEDURE — G8417 CALC BMI ABV UP PARAM F/U: HCPCS | Performed by: NURSE PRACTITIONER

## 2022-06-14 PROCEDURE — 4004F PT TOBACCO SCREEN RCVD TLK: CPT | Performed by: NURSE PRACTITIONER

## 2022-06-14 PROCEDURE — 99214 OFFICE O/P EST MOD 30 MIN: CPT | Performed by: NURSE PRACTITIONER

## 2022-06-14 RX ORDER — BUPRENORPHINE HYDROCHLORIDE 75 UG/1
FILM, SOLUBLE BUCCAL
COMMUNITY
Start: 2022-05-18

## 2022-10-28 ENCOUNTER — TELEPHONE (OUTPATIENT)
Dept: NEUROLOGY | Age: 47
End: 2022-10-28

## 2022-11-02 NOTE — TELEPHONE ENCOUNTER
Peggy pt and he stated he has not completed MRI brain. He requested order be sent to [facility] central scheduling. Rescheduled appt for later date and faxed order for MRI brain to [facility].

## 2023-02-21 ENCOUNTER — TELEPHONE (OUTPATIENT)
Dept: NEUROLOGY | Age: 48
End: 2023-02-21

## 2023-02-21 NOTE — TELEPHONE ENCOUNTER
Pt scheduled for ov 2/23/23 and has not completed MRI. Pt would like to have MRI at [facility]. Gave pt contact info for [facility] central scheduling to schedule MRI and rescheduled ov for 3/10/23.

## 2024-04-22 ENCOUNTER — HOSPITAL ENCOUNTER (EMERGENCY)
Age: 49
Discharge: HOME | End: 2024-04-22
Attending: EMERGENCY MEDICINE
Payer: OTHER MISCELLANEOUS

## 2024-04-22 ENCOUNTER — APPOINTMENT (OUTPATIENT)
Dept: CT IMAGING | Age: 49
End: 2024-04-22
Payer: OTHER MISCELLANEOUS

## 2024-04-22 VITALS
HEIGHT: 70 IN | OXYGEN SATURATION: 95 % | HEART RATE: 70 BPM | WEIGHT: 230 LBS | BODY MASS INDEX: 32.93 KG/M2 | TEMPERATURE: 97.2 F | DIASTOLIC BLOOD PRESSURE: 91 MMHG | SYSTOLIC BLOOD PRESSURE: 138 MMHG | RESPIRATION RATE: 16 BRPM
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
## 2024-04-22 DIAGNOSIS — S20.219A CONTUSION OF STERNUM, INITIAL ENCOUNTER: ICD-10-CM

## 2024-04-22 DIAGNOSIS — S22.43XA CLOSED FRACTURE OF MULTIPLE RIBS OF BOTH SIDES, INITIAL ENCOUNTER: ICD-10-CM

## 2024-04-22 DIAGNOSIS — S16.1XXA STRAIN OF NECK MUSCLE, INITIAL ENCOUNTER: ICD-10-CM

## 2024-04-22 PROCEDURE — 72125 CT NECK SPINE W/O DYE: CPT

## 2024-04-22 PROCEDURE — 99284 EMERGENCY DEPT VISIT MOD MDM: CPT

## 2024-04-22 PROCEDURE — 71250 CT THORAX DX C-: CPT

## 2024-04-22 PROCEDURE — 96372 THER/PROPH/DIAG INJ SC/IM: CPT

## 2024-04-22 PROCEDURE — 6360000002 HC RX W HCPCS: Performed by: EMERGENCY MEDICINE

## 2024-04-22 PROCEDURE — 6370000000 HC RX 637 (ALT 250 FOR IP): Performed by: EMERGENCY MEDICINE

## 2024-04-22 RX ORDER — OXYCODONE HYDROCHLORIDE AND ACETAMINOPHEN 5; 325 MG/1; MG/1
1 TABLET ORAL EVERY 4 HOURS PRN
Qty: 20 TABLET | Refills: 0 | Status: SHIPPED | OUTPATIENT
Start: 2024-04-22 | End: 2024-04-25

## 2024-04-22 RX ORDER — MORPHINE SULFATE 4 MG/ML
4 INJECTION, SOLUTION INTRAMUSCULAR; INTRAVENOUS ONCE
Status: COMPLETED | OUTPATIENT
Start: 2024-04-22 | End: 2024-04-22

## 2024-04-22 RX ORDER — KETOROLAC TROMETHAMINE 15 MG/ML
30 INJECTION, SOLUTION INTRAMUSCULAR; INTRAVENOUS ONCE
Status: COMPLETED | OUTPATIENT
Start: 2024-04-22 | End: 2024-04-22

## 2024-04-22 RX ORDER — ONDANSETRON 4 MG/1
4 TABLET, ORALLY DISINTEGRATING ORAL ONCE
Status: COMPLETED | OUTPATIENT
Start: 2024-04-22 | End: 2024-04-22

## 2024-04-22 RX ORDER — IBUPROFEN 800 MG/1
800 TABLET ORAL EVERY 8 HOURS PRN
Qty: 24 TABLET | Refills: 0 | Status: SHIPPED | OUTPATIENT
Start: 2024-04-22

## 2024-04-22 RX ADMIN — ONDANSETRON 4 MG: 4 TABLET, ORALLY DISINTEGRATING ORAL at 08:55

## 2024-04-22 RX ADMIN — MORPHINE SULFATE 4 MG: 4 INJECTION, SOLUTION INTRAMUSCULAR; INTRAVENOUS at 08:54

## 2024-04-22 RX ADMIN — KETOROLAC TROMETHAMINE 30 MG: 15 INJECTION, SOLUTION INTRAMUSCULAR; INTRAVENOUS at 08:55

## 2024-04-22 ASSESSMENT — PAIN DESCRIPTION - LOCATION: LOCATION: BACK;RIB CAGE;NECK

## 2024-04-22 ASSESSMENT — PAIN SCALES - GENERAL: PAINLEVEL_OUTOF10: 10

## 2024-04-22 ASSESSMENT — PAIN DESCRIPTION - FREQUENCY: FREQUENCY: CONTINUOUS

## 2024-04-22 ASSESSMENT — PAIN - FUNCTIONAL ASSESSMENT
PAIN_FUNCTIONAL_ASSESSMENT: ACTIVITIES ARE NOT PREVENTED
PAIN_FUNCTIONAL_ASSESSMENT: PREVENTS OR INTERFERES WITH MANY ACTIVE NOT PASSIVE ACTIVITIES
PAIN_FUNCTIONAL_ASSESSMENT: 0-10

## 2024-04-22 ASSESSMENT — PAIN DESCRIPTION - DESCRIPTORS: DESCRIPTORS: SORE
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
## 2024-04-30 ENCOUNTER — OFFICE VISIT (OUTPATIENT)
Dept: FAMILY MEDICINE CLINIC | Age: 49
End: 2024-04-30
Payer: MEDICAID

## 2024-04-30 VITALS
DIASTOLIC BLOOD PRESSURE: 78 MMHG | OXYGEN SATURATION: 95 % | WEIGHT: 232.2 LBS | SYSTOLIC BLOOD PRESSURE: 124 MMHG | HEART RATE: 87 BPM | BODY MASS INDEX: 33.32 KG/M2

## 2024-04-30 DIAGNOSIS — Z12.11 SCREENING FOR COLORECTAL CANCER: ICD-10-CM

## 2024-04-30 DIAGNOSIS — I25.9 CHRONIC ISCHEMIC HEART DISEASE: ICD-10-CM

## 2024-04-30 DIAGNOSIS — E78.2 MIXED HYPERLIPIDEMIA: ICD-10-CM

## 2024-04-30 DIAGNOSIS — F17.200 SMOKER: ICD-10-CM

## 2024-04-30 DIAGNOSIS — R01.1 CARDIAC MURMUR, UNSPECIFIED: ICD-10-CM

## 2024-04-30 DIAGNOSIS — Z86.74 HISTORY OF CARDIAC ARREST: ICD-10-CM

## 2024-04-30 DIAGNOSIS — S22.43XD MULTIPLE CLOSED FRACTURES OF RIBS OF BOTH SIDES WITH ROUTINE HEALING, SUBSEQUENT ENCOUNTER: Primary | ICD-10-CM

## 2024-04-30 DIAGNOSIS — M54.12 CERVICAL RADICULOPATHY: ICD-10-CM

## 2024-04-30 DIAGNOSIS — Z12.12 SCREENING FOR COLORECTAL CANCER: ICD-10-CM

## 2024-04-30 DIAGNOSIS — I42.9 CARDIOMYOPATHY, UNSPECIFIED TYPE (HCC): ICD-10-CM

## 2024-04-30 DIAGNOSIS — N18.31 STAGE 3A CHRONIC KIDNEY DISEASE (HCC): ICD-10-CM

## 2024-04-30 DIAGNOSIS — D50.9 IRON DEFICIENCY ANEMIA, UNSPECIFIED IRON DEFICIENCY ANEMIA TYPE: ICD-10-CM

## 2024-04-30 DIAGNOSIS — I25.2 HISTORY OF MI (MYOCARDIAL INFARCTION): ICD-10-CM

## 2024-04-30 DIAGNOSIS — E66.1 CLASS 1 DRUG-INDUCED OBESITY WITH SERIOUS COMORBIDITY AND BODY MASS INDEX (BMI) OF 33.0 TO 33.9 IN ADULT: ICD-10-CM

## 2024-04-30 DIAGNOSIS — R73.9 ELEVATED BLOOD SUGAR: ICD-10-CM

## 2024-04-30 PROBLEM — E66.811 CLASS 1 DRUG-INDUCED OBESITY WITH SERIOUS COMORBIDITY AND BODY MASS INDEX (BMI) OF 33.0 TO 33.9 IN ADULT: Status: ACTIVE | Noted: 2024-04-30

## 2024-04-30 PROBLEM — S22.43XA MULTIPLE CLOSED FRACTURES OF RIBS OF BOTH SIDES: Status: ACTIVE | Noted: 2024-04-30

## 2024-04-30 PROCEDURE — 99215 OFFICE O/P EST HI 40 MIN: CPT | Performed by: FAMILY MEDICINE

## 2024-04-30 PROCEDURE — G8427 DOCREV CUR MEDS BY ELIG CLIN: HCPCS | Performed by: FAMILY MEDICINE

## 2024-04-30 PROCEDURE — G8417 CALC BMI ABV UP PARAM F/U: HCPCS | Performed by: FAMILY MEDICINE

## 2024-04-30 PROCEDURE — 4004F PT TOBACCO SCREEN RCVD TLK: CPT | Performed by: FAMILY MEDICINE

## 2024-04-30 RX ORDER — PREDNISONE 20 MG/1
20 TABLET ORAL 2 TIMES DAILY
Qty: 10 TABLET | Refills: 0 | Status: SHIPPED | OUTPATIENT
Start: 2024-04-30 | End: 2024-05-05

## 2024-04-30 RX ORDER — OXYCODONE HYDROCHLORIDE AND ACETAMINOPHEN 5; 325 MG/1; MG/1
1 TABLET ORAL EVERY 8 HOURS PRN
Qty: 21 TABLET | Refills: 0 | Status: SHIPPED | OUTPATIENT
Start: 2024-04-30 | End: 2024-05-07

## 2024-04-30 RX ORDER — METHOCARBAMOL 500 MG/1
500 TABLET, FILM COATED ORAL 4 TIMES DAILY PRN
Qty: 60 TABLET | Refills: 0 | Status: SHIPPED | OUTPATIENT
Start: 2024-04-30 | End: 2024-05-15

## 2024-04-30 RX ORDER — IBUPROFEN 800 MG/1
800 TABLET ORAL 3 TIMES DAILY PRN
Qty: 60 TABLET | Refills: 1 | Status: SHIPPED | OUTPATIENT
Start: 2024-04-30

## 2024-04-30 SDOH — ECONOMIC STABILITY: FOOD INSECURITY: WITHIN THE PAST 12 MONTHS, YOU WORRIED THAT YOUR FOOD WOULD RUN OUT BEFORE YOU GOT MONEY TO BUY MORE.: NEVER TRUE

## 2024-04-30 SDOH — ECONOMIC STABILITY: HOUSING INSECURITY
IN THE LAST 12 MONTHS, WAS THERE A TIME WHEN YOU DID NOT HAVE A STEADY PLACE TO SLEEP OR SLEPT IN A SHELTER (INCLUDING NOW)?: NO

## 2024-04-30 SDOH — ECONOMIC STABILITY: INCOME INSECURITY: HOW HARD IS IT FOR YOU TO PAY FOR THE VERY BASICS LIKE FOOD, HOUSING, MEDICAL CARE, AND HEATING?: NOT HARD AT ALL

## 2024-04-30 SDOH — ECONOMIC STABILITY: FOOD INSECURITY: WITHIN THE PAST 12 MONTHS, THE FOOD YOU BOUGHT JUST DIDN'T LAST AND YOU DIDN'T HAVE MONEY TO GET MORE.: NEVER TRUE

## 2024-04-30 ASSESSMENT — PATIENT HEALTH QUESTIONNAIRE - PHQ9
SUM OF ALL RESPONSES TO PHQ QUESTIONS 1-9: 3
6. FEELING BAD ABOUT YOURSELF - OR THAT YOU ARE A FAILURE OR HAVE LET YOURSELF OR YOUR FAMILY DOWN: NOT AT ALL
SUM OF ALL RESPONSES TO PHQ QUESTIONS 1-9: 3
3. TROUBLE FALLING OR STAYING ASLEEP: SEVERAL DAYS
7. TROUBLE CONCENTRATING ON THINGS, SUCH AS READING THE NEWSPAPER OR WATCHING TELEVISION: NOT AT ALL
4. FEELING TIRED OR HAVING LITTLE ENERGY: SEVERAL DAYS
10. IF YOU CHECKED OFF ANY PROBLEMS, HOW DIFFICULT HAVE THESE PROBLEMS MADE IT FOR YOU TO DO YOUR WORK, TAKE CARE OF THINGS AT HOME, OR GET ALONG WITH OTHER PEOPLE: NOT DIFFICULT AT ALL
5. POOR APPETITE OR OVEREATING: SEVERAL DAYS
8. MOVING OR SPEAKING SO SLOWLY THAT OTHER PEOPLE COULD HAVE NOTICED. OR THE OPPOSITE, BEING SO FIGETY OR RESTLESS THAT YOU HAVE BEEN MOVING AROUND A LOT MORE THAN USUAL: NOT AT ALL
SUM OF ALL RESPONSES TO PHQ9 QUESTIONS 1 & 2: 0
1. LITTLE INTEREST OR PLEASURE IN DOING THINGS: NOT AT ALL
SUM OF ALL RESPONSES TO PHQ QUESTIONS 1-9: 3
SUM OF ALL RESPONSES TO PHQ QUESTIONS 1-9: 3
2. FEELING DOWN, DEPRESSED OR HOPELESS: NOT AT ALL
9. THOUGHTS THAT YOU WOULD BE BETTER OFF DEAD, OR OF HURTING YOURSELF: NOT AT ALL

## 2024-04-30 NOTE — ASSESSMENT & PLAN NOTE
Will monitor kidney function with a CMP.   ----- Message from Dorcas Burnett sent at 7/13/2017  9:01 AM CDT -----  Contact: Self [phone]  Patient is calling to see if he can come in today since he is having Kidney stones. Please advice

## 2024-04-30 NOTE — ASSESSMENT & PLAN NOTE
He is continuing to have significant pain from his rib fractures and I will treat it with oxycodone and ibuprofen.  I will prescribe methocarbamol as a muscle relaxer since the Flexeril did not help.  Note that these medications are intended to both treat rib pain and also left arm pain which sounds like it is a cervical radiculopathy.
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
## 2024-04-30 NOTE — ASSESSMENT & PLAN NOTE
Subjective:       Patient ID: Isidro White Jr. is a 53 y.o. male.    Chief Complaint: Abscess (Lt Axillary)    HPI    Pt reports axillary abscess was going away but he reports it started coming back and is tender again  Pt admits to using axe spray under the affected arm  No fever      Past Medical History:   Diagnosis Date    Anemia     Aortic valve stenosis     s/p mechanical AVR    Atrial fibrillation     Atrial flutter     Cardiomyopathy     CHF (congestive heart failure)     Drug-induced erectile dysfunction     ESRD due to hypertension     HD M, W, F    ESRD on dialysis     GERD (gastroesophageal reflux disease)     Hepatitis B     Hyperlipidemia     Hypertension     Nightmares     Obesity     DANIEL (obstructive sleep apnea) 11/12/2019    Secondary hyperparathyroidism of renal origin     Supraventricular tachycardia     atrial tachycardia    Tachycardia     Valvular regurgitation     AI, TR       Past Surgical History:   Procedure Laterality Date    ASD REPAIR      age 9 [facility]    AV Graft Creation Left 03/2017    CARDIAC CATHETERIZATION      [facility]     CARDIAC VALVE SURGERY  02/08/2017    22 mm Medtronic AV, 28 mm TV Medtronic annuloplaty ring    COLONOSCOPY N/A 8/27/2019    Procedure: COLONOSCOPY;  Surgeon: Addie John MD;  Location: [facility] ENDO;  Service: Endoscopy;  Laterality: N/A;  dialysis pt *needs K*    COLONOSCOPY N/A 9/3/2020    Procedure: COLONOSCOPY-need INR;  Surgeon: Jemma Youngblood MD;  Location: [facility] ENDO;  Service: Endoscopy;  Laterality: N/A;    ESOPHAGOGASTRODUODENOSCOPY N/A 8/27/2019    Procedure: EGD (ESOPHAGOGASTRODUODENOSCOPY);  Surgeon: Addie John MD;  Location: [facility] ENDO;  Service: Endoscopy;  Laterality: N/A;    RADIOFREQUENCY ABLATION  03/13/2017    Atrial tachycardia    REMOVAL OF GRAFT Left 7/2/2020    Procedure: REMOVAL, GRAFT;  Surgeon: Sascha Sidhu MD;  Location: [facility] OR;  Service: Peripheral Vascular;  Laterality: Left;   Encouraged weight loss but also pointed out that if it is muscle instead of fat that he would be fine.      RIGHT HEART CATHETERIZATION Right 8/12/2021    Procedure: INSERTION, CATHETER, RIGHT HEART;  Surgeon: Mariah Pirece MD;  Location: [facility] CATH LAB;  Service: Cardiology;  Laterality: Right;     Social History     Socioeconomic History    Marital status: Single   Tobacco Use    Smoking status: Never Smoker    Smokeless tobacco: Never Used   Substance and Sexual Activity    Alcohol use: No     Comment: quit in 2016; does have heavy drinking history; started drinking at age 14; was drinking a 12 pack over the weekend and two 24 ounces of beer a day during the week along with a pint of hard alcohol    Drug use: No    Sexual activity: Not Currently   Social History Narrative    Caregiver Nurse Marjan Swain; no pets or smokers in household.     Review of patient's allergies indicates:  No Known Allergies  Current Outpatient Medications   Medication Sig    albuterol (VENTOLIN HFA) 90 mcg/actuation inhaler Inhale 2 puffs into the lungs every 6 (six) hours as needed for Wheezing or Shortness of Breath. Rescue    amLODIPine (NORVASC) 10 MG tablet Take 1 tablet by mouth every day    ascorbic acid, vitamin C, (VITAMIN C) 500 MG tablet Take 1 tablet (500 mg total) by mouth 2 (two) times daily.    b complex vitamins tablet Take 1 tablet by mouth once daily.    calcium acetate,phosphat bind, (PHOSLO) 667 mg capsule Take 2 capsules (1,334 mg total) by mouth 3 (three) times daily.    dialysis solutions (PERITONEAL DIALYSIS SOLUTION IP) Inject into the peritoneum every Mon, Wed, Fri. Lt FA Fistula, [facility] on M, W, F.    diltiaZEM (CARDIZEM CD) 360 MG 24 hr capsule Take 1 capsule (360 mg total) by mouth once daily.    epoetin vikki (PROCRIT) 10,000 unit/mL injection Inject 1 mL (10,000 Units total) into the skin every Mon, Wed, Fri. To be given with HD    ferrous sulfate 324 mg (65 mg iron) TbEC Take 325 mg by mouth once daily.    fish oil-omega-3 fatty acids 300-1,000 mg capsule Take 2 g by  mouth once daily.    hydrALAZINE (APRESOLINE) 100 MG tablet TAKE ONE TABLET BY MOUTH THREE TIMES DAILY INCLUDING DIALYSIS DAYS    hydroCHLOROthiazide (MICROZIDE) 12.5 mg capsule     hydrOXYzine pamoate (VISTARIL) 25 MG Cap take 1 capsule by mouth daily as needed for itching    labetaloL (NORMODYNE) 200 MG tablet Take 1 tablet (200 mg total) by mouth 3 (three) times daily.    labetaloL (NORMODYNE) 200 MG tablet Take 1 tablet by mouth three times daily    labetaloL (NORMODYNE) 200 MG tablet Take 1 tablet (200 mg total) by mouth 3 (three) times daily.    lisinopriL (PRINIVIL,ZESTRIL) 40 MG tablet Take 1 tablet (40 mg total) by mouth once daily.    multivitamin with minerals tablet Take 1 tablet by mouth once daily.    pantoprazole (PROTONIX) 40 MG tablet TAKE ONE TABLET BY MOUTH EVERY DAY    ETHAN-RACQUEL RX 1- mg-mg-mcg Tab TAKE ONE TABLET BY MOUTH EVERY DAY    tenofovir disoproxil fumarate (VIREAD) 300 mg Tab TAKE 1 TABLET BY MOUTH ONCE WEEKLY    triamcinolone acetonide 0.1% (KENALOG) 0.1 % ointment Apply topically 2 (two) times daily.    vit b cmplx 3-fa-vit c-biotin 1- mg-mg-mcg (ETHAN-RACQUEL RX) 1- mg-mg-mcg Tab Take 1 tablet by mouth once daily.    vitamin D (VITAMIN D3) 1000 units Tab Take 1 tablet by mouth daily    warfarin (COUMADIN) 1 MG tablet     warfarin (COUMADIN) 7.5 MG tablet Take 2 tablets by mouth on Tuesday, Thursdays, and Saturday; and 1 tablet on all other days of the week or as directed by coumadin clinic. (Patient taking differently: Take 1 tablet daily or as directed by coumadin clinic.)    amoxicillin-clavulanate 500-125mg (AUGMENTIN) 500-125 mg Tab Take 1 tablet (500 mg total) by mouth once daily.    omega-3 fatty acids-vitamin E (FISH OIL) 1,000 mg Cap Take 1 capsule by mouth once daily.     No current facility-administered medications for this visit.     Facility-Administered Medications Ordered in Other Visits   Medication    0.9%  NaCl infusion    sodium  chloride 0.9% flush 10 mL           Review of Systems   Constitutional: Negative for activity change, appetite change, chills, diaphoresis, fatigue, fever and unexpected weight change.   HENT: Negative for congestion, ear pain, postnasal drip, rhinorrhea, sinus pressure, sinus pain, sneezing, sore throat, tinnitus, trouble swallowing and voice change.    Eyes: Negative for photophobia, pain and visual disturbance.   Respiratory: Negative for cough, chest tightness, shortness of breath and wheezing.    Cardiovascular: Negative for chest pain, palpitations and leg swelling.   Gastrointestinal: Negative for abdominal distention, abdominal pain, constipation, diarrhea, nausea and vomiting.   Genitourinary: Negative for decreased urine volume, difficulty urinating, dysuria, flank pain, frequency, hematuria and urgency.   Musculoskeletal: Negative for arthralgias, back pain, joint swelling, neck pain and neck stiffness.   Skin:        Boil to L axillary region   Allergic/Immunologic: Negative for immunocompromised state.   Neurological: Negative for dizziness, tremors, seizures, syncope, facial asymmetry, speech difficulty, weakness, light-headedness, numbness and headaches.   Hematological: Negative for adenopathy. Does not bruise/bleed easily.   Psychiatric/Behavioral: Negative for confusion and sleep disturbance.       Objective:      Physical Exam  Vitals reviewed.   Skin:     Comments: L axillary boil. Draining scant yellow drainage   Neurological:      Mental Status: He is alert.         Assessment:     Vitals:    02/03/22 1639   BP: 108/68   Pulse: 95   Resp: 16   Temp: 98.6 °F (37 °C)         1. Furuncle, axillary fold        Plan:   Furuncle, axillary fold    Other orders  -     amoxicillin-clavulanate 500-125mg (AUGMENTIN) 500-125 mg Tab; Take 1 tablet (500 mg total) by mouth once daily.  Dispense: 14 tablet; Refill: 0      As above  Warm compresses  No axe spray under L arm until infection completely  resolved  Worrisome s/s that warrant re-eval discussed

## 2024-04-30 NOTE — ASSESSMENT & PLAN NOTE
He is under the impression that he was completely cleared by a cardiologist a few years ago after a serious hospitalization when he had sepsis and acute kidney failure and a cardiac arrest.  Coronary artery calcifications are showing up on the CT that he had of his chest.  He has not seen a cardiologist recently.  I am putting in a referral to a cardiologist so that he can have his heart status reevaluated.

## 2024-05-01 PROBLEM — E78.2 MIXED HYPERLIPIDEMIA: Status: ACTIVE | Noted: 2024-05-01

## 2024-05-01 LAB
ALBUMIN SERPL-MCNC: 4.8 G/DL (ref 3.4–5)
ALBUMIN/GLOB SERPL: 1.8 {RATIO} (ref 1.1–2.2)
ALP SERPL-CCNC: 104 U/L (ref 40–129)
ALT SERPL-CCNC: 16 U/L (ref 10–40)
ANION GAP SERPL CALCULATED.3IONS-SCNC: 15 MMOL/L (ref 3–16)
AST SERPL-CCNC: 16 U/L (ref 15–37)
BASOPHILS # BLD: 0 K/UL (ref 0–0.2)
BASOPHILS NFR BLD: 0 %
BILIRUB SERPL-MCNC: <0.2 MG/DL (ref 0–1)
BUN SERPL-MCNC: 21 MG/DL (ref 7–20)
CALCIUM SERPL-MCNC: 9.5 MG/DL (ref 8.3–10.6)
CHLORIDE SERPL-SCNC: 103 MMOL/L (ref 99–110)
CHOLEST SERPL-MCNC: 241 MG/DL (ref 0–199)
CO2 SERPL-SCNC: 22 MMOL/L (ref 21–32)
CREAT SERPL-MCNC: 0.5 MG/DL (ref 0.9–1.3)
DEPRECATED RDW RBC AUTO: 13.8 % (ref 12.4–15.4)
EOSINOPHIL # BLD: 0.1 K/UL (ref 0–0.6)
EOSINOPHIL NFR BLD: 1 %
EST. AVERAGE GLUCOSE BLD GHB EST-MCNC: 111.2 MG/DL
FERRITIN SERPL IA-MCNC: 143.6 NG/ML (ref 30–400)
GFR SERPLBLD CREATININE-BSD FMLA CKD-EPI: >90 ML/MIN/{1.73_M2}
GLUCOSE SERPL-MCNC: 109 MG/DL (ref 70–99)
HBA1C MFR BLD: 5.5 %
HCT VFR BLD AUTO: 42.8 % (ref 40.5–52.5)
HDLC SERPL-MCNC: 38 MG/DL (ref 40–60)
HGB BLD-MCNC: 14.5 G/DL (ref 13.5–17.5)
IRON SATN MFR SERPL: 19 % (ref 20–50)
IRON SERPL-MCNC: 70 UG/DL (ref 59–158)
LDLC SERPL CALC-MCNC: 171 MG/DL
LYMPHOCYTES # BLD: 2.8 K/UL (ref 1–5.1)
LYMPHOCYTES NFR BLD: 26 %
MCH RBC QN AUTO: 31.6 PG (ref 26–34)
MCHC RBC AUTO-ENTMCNC: 33.9 G/DL (ref 31–36)
MCV RBC AUTO: 93 FL (ref 80–100)
MONOCYTES # BLD: 0.7 K/UL (ref 0–1.3)
MONOCYTES NFR BLD: 7 %
NEUTROPHILS # BLD: 7 K/UL (ref 1.7–7.7)
NEUTROPHILS NFR BLD: 64 %
NEUTS BAND NFR BLD MANUAL: 2 % (ref 0–7)
PLATELET # BLD AUTO: 269 K/UL (ref 135–450)
PLATELET BLD QL SMEAR: ADEQUATE
PMV BLD AUTO: 8.2 FL (ref 5–10.5)
POTASSIUM SERPL-SCNC: 4.3 MMOL/L (ref 3.5–5.1)
PROT SERPL-MCNC: 7.4 G/DL (ref 6.4–8.2)
RBC # BLD AUTO: 4.6 M/UL (ref 4.2–5.9)
SODIUM SERPL-SCNC: 140 MMOL/L (ref 136–145)
TIBC SERPL-MCNC: 375 UG/DL (ref 260–445)
TRIGL SERPL-MCNC: 162 MG/DL (ref 0–150)
VLDLC SERPL CALC-MCNC: 32 MG/DL
WBC # BLD AUTO: 10.6 K/UL (ref 4–11)

## 2024-05-01 RX ORDER — ROSUVASTATIN CALCIUM 10 MG/1
10 TABLET, COATED ORAL DAILY
Qty: 90 TABLET | Refills: 1 | Status: SHIPPED | OUTPATIENT
Start: 2024-05-01

## 2024-05-06 ENCOUNTER — TELEPHONE (OUTPATIENT)
Dept: FAMILY MEDICINE CLINIC | Age: 49
End: 2024-05-06

## 2024-05-06 DIAGNOSIS — S22.43XD MULTIPLE CLOSED FRACTURES OF RIBS OF BOTH SIDES WITH ROUTINE HEALING, SUBSEQUENT ENCOUNTER: ICD-10-CM

## 2024-05-06 RX ORDER — TRAZODONE HYDROCHLORIDE 50 MG/1
50-100 TABLET ORAL NIGHTLY PRN
Qty: 60 TABLET | Refills: 0 | Status: SHIPPED | OUTPATIENT
Start: 2024-05-06

## 2024-05-06 RX ORDER — OXYCODONE HYDROCHLORIDE AND ACETAMINOPHEN 5; 325 MG/1; MG/1
1 TABLET ORAL EVERY 8 HOURS PRN
Qty: 20 TABLET | Refills: 0 | Status: SHIPPED | OUTPATIENT
Start: 2024-05-06 | End: 2024-05-13 | Stop reason: SDUPTHER

## 2024-05-06 NOTE — TELEPHONE ENCOUNTER
Patient informed. He wants to know if there is anything else he can do. He stated he has used heat, pain and massage the area. He stated he used a TENS unit and gets about 10 minutes of relief. He wants to know if there is anything you can give him to help him sleep. He also wanted to know if you think going to the ER again would get the MRI done sooner since it isn't scheduled until this Friday at 7 am.

## 2024-05-06 NOTE — TELEPHONE ENCOUNTER
I will put in a prescription for trazodone to help with the sleep.    I think cold might be more of a pain relief mechanism than heat so perhaps trying ice would be helpful.    I cannot say with respect to going to the ER as a means of getting the MRI done sooner.  In general a person is not supposed to do that.    A person can take a certain amount of acetaminophen in addition to the oxycodone as long as the total amount of acetaminophen in the oxycodone tablets plus in the separate acetaminophen pills does not get more than 4000 mg in a day.

## 2024-05-06 NOTE — TELEPHONE ENCOUNTER
Patient called stated he has been in pain and is out of pain medication.  He has a MRI of shoulder on Friday.  He hasn't slept in 2 weeks he has tried sitting up, laying down he cannot get comfortable.  He does not know what he can do.  His shoulder make him very uncomfortable.  Please advise

## 2024-05-06 NOTE — TELEPHONE ENCOUNTER
Reviewed PDMP and he got a prescription for 21 oxycodone tablets on 4/30/2024.  Previously got a prescription for 20 tablets on 4/22/2024.  Since he has some significant fractures I will send a prescription for an additional 20 tablets.  MRI result is not yet in epic

## 2024-05-10 ENCOUNTER — HOSPITAL ENCOUNTER (OUTPATIENT)
Dept: MRI IMAGING | Age: 49
Discharge: HOME | End: 2024-05-10
Payer: MEDICAID

## 2024-05-10 DIAGNOSIS — M54.12 CERVICAL RADICULOPATHY: ICD-10-CM

## 2024-05-10 DIAGNOSIS — M54.12 CERVICAL RADICULOPATHY: Primary | ICD-10-CM

## 2024-05-10 PROCEDURE — 72141 MRI NECK SPINE W/O DYE: CPT

## 2024-05-13 DIAGNOSIS — S22.43XD MULTIPLE CLOSED FRACTURES OF RIBS OF BOTH SIDES WITH ROUTINE HEALING, SUBSEQUENT ENCOUNTER: ICD-10-CM

## 2024-05-13 RX ORDER — OXYCODONE HYDROCHLORIDE AND ACETAMINOPHEN 5; 325 MG/1; MG/1
1 TABLET ORAL EVERY 8 HOURS PRN
Qty: 20 TABLET | Refills: 0 | Status: SHIPPED | OUTPATIENT
Start: 2024-05-13 | End: 2024-05-20

## 2024-05-14 RX ORDER — OXYCODONE HYDROCHLORIDE AND ACETAMINOPHEN 5; 325 MG/1; MG/1
1 TABLET ORAL EVERY 8 HOURS PRN
Qty: 20 TABLET | Refills: 0 | OUTPATIENT
Start: 2024-05-14 | End: 2024-05-21

## 2024-05-20 DIAGNOSIS — S22.43XD MULTIPLE CLOSED FRACTURES OF RIBS OF BOTH SIDES WITH ROUTINE HEALING, SUBSEQUENT ENCOUNTER: ICD-10-CM

## 2024-05-20 RX ORDER — OXYCODONE HYDROCHLORIDE AND ACETAMINOPHEN 5; 325 MG/1; MG/1
1 TABLET ORAL EVERY 8 HOURS PRN
Qty: 20 TABLET | Refills: 0 | OUTPATIENT
Start: 2024-05-20 | End: 2024-05-27

## 2024-05-21 NOTE — TELEPHONE ENCOUNTER
Patient states he is still in a lot of pain.  He is also taking Ibuprofen and the muscle relaxer with little relief and has 4 tablets left of the muscle relaxer.  He has been using ice and bio freeze daily.

## 2024-05-22 ENCOUNTER — OFFICE VISIT (OUTPATIENT)
Dept: FAMILY MEDICINE CLINIC | Age: 49
End: 2024-05-22
Payer: MEDICAID

## 2024-05-22 VITALS
WEIGHT: 236 LBS | DIASTOLIC BLOOD PRESSURE: 70 MMHG | BODY MASS INDEX: 33.86 KG/M2 | HEART RATE: 76 BPM | OXYGEN SATURATION: 90 % | SYSTOLIC BLOOD PRESSURE: 134 MMHG

## 2024-05-22 DIAGNOSIS — F17.200 SMOKER: ICD-10-CM

## 2024-05-22 DIAGNOSIS — E66.1 CLASS 1 DRUG-INDUCED OBESITY WITH SERIOUS COMORBIDITY AND BODY MASS INDEX (BMI) OF 33.0 TO 33.9 IN ADULT: ICD-10-CM

## 2024-05-22 DIAGNOSIS — Z00.00 ENCOUNTER FOR WELL ADULT EXAM WITHOUT ABNORMAL FINDINGS: Primary | ICD-10-CM

## 2024-05-22 DIAGNOSIS — G89.29 CHRONIC PAIN OF RIGHT LOWER EXTREMITY: ICD-10-CM

## 2024-05-22 DIAGNOSIS — M79.604 CHRONIC PAIN OF RIGHT LOWER EXTREMITY: ICD-10-CM

## 2024-05-22 DIAGNOSIS — R23.4 SKIN SCALES: ICD-10-CM

## 2024-05-22 DIAGNOSIS — S22.43XD MULTIPLE CLOSED FRACTURES OF RIBS OF BOTH SIDES WITH ROUTINE HEALING, SUBSEQUENT ENCOUNTER: ICD-10-CM

## 2024-05-22 DIAGNOSIS — M54.12 CERVICAL RADICULOPATHY: ICD-10-CM

## 2024-05-22 PROCEDURE — 4004F PT TOBACCO SCREEN RCVD TLK: CPT | Performed by: FAMILY MEDICINE

## 2024-05-22 PROCEDURE — G8417 CALC BMI ABV UP PARAM F/U: HCPCS | Performed by: FAMILY MEDICINE

## 2024-05-22 PROCEDURE — 99396 PREV VISIT EST AGE 40-64: CPT | Performed by: FAMILY MEDICINE

## 2024-05-22 PROCEDURE — 99213 OFFICE O/P EST LOW 20 MIN: CPT | Performed by: FAMILY MEDICINE

## 2024-05-22 PROCEDURE — G8427 DOCREV CUR MEDS BY ELIG CLIN: HCPCS | Performed by: FAMILY MEDICINE

## 2024-05-22 RX ORDER — OXYCODONE HYDROCHLORIDE AND ACETAMINOPHEN 5; 325 MG/1; MG/1
1 TABLET ORAL EVERY 8 HOURS PRN
Qty: 30 TABLET | Refills: 0 | Status: SHIPPED | OUTPATIENT
Start: 2024-05-22 | End: 2024-06-04

## 2024-05-22 ASSESSMENT — ENCOUNTER SYMPTOMS
COUGH: 0
NAUSEA: 0
EYE PAIN: 0
BACK PAIN: 1
VOMITING: 0
DIARRHEA: 0
TROUBLE SWALLOWING: 0
SHORTNESS OF BREATH: 1
CONSTIPATION: 0
ABDOMINAL PAIN: 0
APNEA: 0

## 2024-05-22 NOTE — ASSESSMENT & PLAN NOTE
Pain in neck is likely in part due to rib fractures but also in part due to cervical radiculopathy.  Neurosurgery referral has been placed.  A short-term pain medicine prescription was given.  See also the discussion under the rib fractures.

## 2024-05-22 NOTE — PROGRESS NOTES
which may be due to fractured ribs and some of it may be due to cervical radiculopathy.  I have referred him to a neurosurgeon for more evaluation since he has 4 levels of foraminal stenosis on the left side of his neck and 1 on the right.  Also referring to a pain management doctor.  I am prescribing a limited amount of additional oxycodone to help with the pain for the next couple of weeks.  He states that he likes to avoid taking medications for pain.  Orders:  -     External Referral To Pain Clinic  -     oxyCODONE-acetaminophen (PERCOCET) 5-325 MG per tablet; Take 1 tablet by mouth every 8 hours as needed for Pain for up to 13 days. Intended supply: 13-14 days. Take lowest dose possible to manage pain Max Daily Amount: 3 tablets, Disp-30 tablet, R-0Normal          Return in about 29 days (around 6/20/2024) for rib fractures.   Marvel Eller MD

## 2024-05-22 NOTE — PATIENT INSTRUCTIONS
exercise, and other steps.    Get the tests that you and your doctor decide on. Depending on your age and risks, examples might include screening for diabetes; hepatitis C; HIV; and cervical, breast, lung, and colon cancer. Screening helps find diseases before any symptoms appear.   Eat healthy foods. Choose fruits, vegetables, whole grains, lean protein, and low-fat dairy foods. Limit saturated fat and reduce salt.     Limit alcohol. Men should have no more than 2 drinks a day. Women should have no more than 1. For some people, no alcohol is the best choice.   Exercise. Get at least 30 minutes of exercise on most days of the week. Walking can be a good choice.     Reach and stay at your healthy weight. This will lower your risk for many health problems.   Take care of your mental health. Try to stay connected with friends, family, and community, and find ways to manage stress.     If you're feeling depressed or hopeless, talk to someone. A counselor can help. If you don't have a counselor, talk to your doctor.   Talk to your doctor if you think you may have a problem with alcohol or drug use. This includes prescription medicines, marijuana, and other drugs.     Avoid tobacco and nicotine: Don't smoke, vape, or chew. If you need help quitting, talk to your doctor.   Practice safer sex. Getting tested, using condoms or dental dams, and limiting sex partners can help prevent STIs.     Use birth control if it's important to you to prevent pregnancy. Talk with your doctor about your choices and what might be best for you.   Prevent problems where you can. Protect your skin from too much sun, wash your hands, brush your teeth twice a day, and wear a seat belt in the car.   Where can you learn more?  Go to https://www.[employer].net/patientEd and enter P072 to learn more about \"Well Visit, Ages 18 to 65: Care Instructions.\"  Current as of: August 6, 2023               Content Version: 14.0  © 1533-1846 [employer]wise,

## 2024-05-22 NOTE — ASSESSMENT & PLAN NOTE
Currently has pain in the left upper body some of which may be due to fractured ribs and some of it may be due to cervical radiculopathy.  I have referred him to a neurosurgeon for more evaluation since he has 4 levels of foraminal stenosis on the left side of his neck and 1 on the right.  Also referring to a pain management doctor.

## 2024-05-30 PROBLEM — D50.9 IRON DEFICIENCY ANEMIA: Status: RESOLVED | Noted: 2022-01-04 | Resolved: 2024-05-30

## 2024-07-09 ENCOUNTER — OFFICE VISIT (OUTPATIENT)
Dept: FAMILY MEDICINE CLINIC | Age: 49
End: 2024-07-09
Payer: MEDICARE

## 2024-07-09 VITALS
TEMPERATURE: 97.7 F | DIASTOLIC BLOOD PRESSURE: 70 MMHG | WEIGHT: 229.2 LBS | OXYGEN SATURATION: 93 % | HEART RATE: 77 BPM | SYSTOLIC BLOOD PRESSURE: 118 MMHG | BODY MASS INDEX: 32.89 KG/M2

## 2024-07-09 DIAGNOSIS — G47.00 INSOMNIA, UNSPECIFIED TYPE: ICD-10-CM

## 2024-07-09 DIAGNOSIS — G89.29 CHRONIC PAIN OF RIGHT LOWER EXTREMITY: ICD-10-CM

## 2024-07-09 DIAGNOSIS — Z12.12 SCREENING FOR COLORECTAL CANCER: ICD-10-CM

## 2024-07-09 DIAGNOSIS — E66.1 CLASS 1 DRUG-INDUCED OBESITY WITH SERIOUS COMORBIDITY AND BODY MASS INDEX (BMI) OF 32.0 TO 32.9 IN ADULT: ICD-10-CM

## 2024-07-09 DIAGNOSIS — E78.2 MIXED HYPERLIPIDEMIA: ICD-10-CM

## 2024-07-09 DIAGNOSIS — M79.604 CHRONIC PAIN OF RIGHT LOWER EXTREMITY: ICD-10-CM

## 2024-07-09 DIAGNOSIS — S22.43XD MULTIPLE CLOSED FRACTURES OF RIBS OF BOTH SIDES WITH ROUTINE HEALING, SUBSEQUENT ENCOUNTER: ICD-10-CM

## 2024-07-09 DIAGNOSIS — Z12.11 SCREENING FOR COLORECTAL CANCER: ICD-10-CM

## 2024-07-09 DIAGNOSIS — F41.9 ANXIETY: Primary | ICD-10-CM

## 2024-07-09 DIAGNOSIS — M54.12 CERVICAL RADICULOPATHY: ICD-10-CM

## 2024-07-09 PROBLEM — F43.21 SITUATIONAL DEPRESSION: Status: RESOLVED | Noted: 2022-01-12 | Resolved: 2024-07-09

## 2024-07-09 PROCEDURE — 36415 COLL VENOUS BLD VENIPUNCTURE: CPT | Performed by: FAMILY MEDICINE

## 2024-07-09 PROCEDURE — G8417 CALC BMI ABV UP PARAM F/U: HCPCS | Performed by: FAMILY MEDICINE

## 2024-07-09 PROCEDURE — 4004F PT TOBACCO SCREEN RCVD TLK: CPT | Performed by: FAMILY MEDICINE

## 2024-07-09 PROCEDURE — 99213 OFFICE O/P EST LOW 20 MIN: CPT | Performed by: FAMILY MEDICINE

## 2024-07-09 PROCEDURE — G8427 DOCREV CUR MEDS BY ELIG CLIN: HCPCS | Performed by: FAMILY MEDICINE

## 2024-07-09 RX ORDER — DULOXETIN HYDROCHLORIDE 60 MG/1
60 CAPSULE, DELAYED RELEASE ORAL DAILY
Qty: 90 CAPSULE | Refills: 1 | Status: SHIPPED | OUTPATIENT
Start: 2024-07-09

## 2024-07-09 RX ORDER — IBUPROFEN 800 MG/1
800 TABLET ORAL 3 TIMES DAILY PRN
Qty: 60 TABLET | Refills: 2 | Status: SHIPPED | OUTPATIENT
Start: 2024-07-09

## 2024-07-09 NOTE — ASSESSMENT & PLAN NOTE
Did not have good benefit with trazodone nor with Lunesta.  His insomnia may be part of his body getting used to not using cannabis products.  I explained that medications with the possible exception of trazodone do not help the body to have the restorative value of sleep.  Recommended that he continue to spend enough time in bed.  Recommended that he avoid screen time before bedtime.  Suggested that he investigate yoga Nidra and meditation as ways of helping the brain to stop thinking about things and get into a more sleeping state.

## 2024-07-09 NOTE — PROGRESS NOTES
risk.  Orders:  -     Lipid Panel  6. Cervical radiculopathy  Assessment & Plan:   He is working with a pain management specialist.  Orders:  -     ibuprofen (ADVIL;MOTRIN) 800 MG tablet; Take 1 tablet by mouth 3 times daily as needed for Pain, Disp-60 tablet, R-2Normal  7. Class 1 drug-induced obesity with serious comorbidity and body mass index (BMI) of 32.0 to 32.9 in adult  Assessment & Plan:   He is having some success with losing weight.  8. Screening for colorectal cancer  -     POCT Fecal Immunochemical Test (FIT); Future          Return in about 4 weeks (around 8/6/2024) for Anxiety.   Marvel Eller MD

## 2024-07-09 NOTE — ASSESSMENT & PLAN NOTE
He is working with a pain management doctor.  Ibuprofen helps with this pain and I will prescribe some additional ibuprofen.  I will also hope that the duloxetine that is primarily for anxiety helps some with the chronic right leg pain or the brains interpretation of the pain.

## 2024-07-09 NOTE — ASSESSMENT & PLAN NOTE
He has a lot of anxiety and I will prescribe duloxetine to help with reducing anxiety as well as hopefully helping reduce the pain in his right leg.

## 2024-11-09 PROBLEM — Z87.448 HISTORY OF ACUTE RENAL FAILURE: Status: ACTIVE | Noted: 2021-07-26

## 2024-12-24 ENCOUNTER — HOSPITAL ENCOUNTER (EMERGENCY)
Age: 49
Discharge: HOME | End: 2024-12-24
Attending: EMERGENCY MEDICINE
Payer: MEDICARE

## 2024-12-24 ENCOUNTER — APPOINTMENT (OUTPATIENT)
Dept: GENERAL RADIOLOGY | Age: 49
End: 2024-12-24
Payer: MEDICARE

## 2024-12-24 VITALS
HEIGHT: 70 IN | SYSTOLIC BLOOD PRESSURE: 132 MMHG | HEART RATE: 90 BPM | WEIGHT: 240 LBS | TEMPERATURE: 97.5 F | BODY MASS INDEX: 34.36 KG/M2 | RESPIRATION RATE: 18 BRPM | DIASTOLIC BLOOD PRESSURE: 87 MMHG | OXYGEN SATURATION: 95 %

## 2024-12-24 DIAGNOSIS — L03.115 CELLULITIS OF RIGHT LOWER EXTREMITY: Primary | ICD-10-CM

## 2024-12-24 LAB
ALBUMIN SERPL-MCNC: 4.1 G/DL (ref 3.4–5)
ALBUMIN/GLOB SERPL: 1.7 {RATIO} (ref 1.1–2.2)
ALP SERPL-CCNC: 74 U/L (ref 40–129)
ALT SERPL-CCNC: 16 U/L (ref 10–40)
ANION GAP SERPL CALCULATED.3IONS-SCNC: 10 MMOL/L (ref 4–16)
AST SERPL-CCNC: 19 U/L (ref 15–37)
BASOPHILS # BLD: 0.04 K/UL
BASOPHILS NFR BLD: 0 % (ref 0–1)
BILIRUB SERPL-MCNC: <0.2 MG/DL (ref 0–1)
BUN SERPL-MCNC: 22 MG/DL (ref 6–23)
CALCIUM SERPL-MCNC: 9.2 MG/DL (ref 8.3–10.6)
CHLORIDE SERPL-SCNC: 102 MMOL/L (ref 99–110)
CO2 SERPL-SCNC: 27 MMOL/L (ref 21–32)
CREAT SERPL-MCNC: 0.7 MG/DL (ref 0.9–1.3)
EOSINOPHIL # BLD: 0.12 K/UL
EOSINOPHILS RELATIVE PERCENT: 1 % (ref 0–3)
ERYTHROCYTE [DISTWIDTH] IN BLOOD BY AUTOMATED COUNT: 12.8 % (ref 11.7–14.9)
GFR, ESTIMATED: >90 ML/MIN/1.73M2
GLUCOSE SERPL-MCNC: 114 MG/DL (ref 70–99)
HCT VFR BLD AUTO: 36.4 % (ref 42–52)
HGB BLD-MCNC: 12.3 G/DL (ref 13.5–18)
IMM GRANULOCYTES # BLD AUTO: 0.03 K/UL
IMM GRANULOCYTES NFR BLD: 0 %
LACTATE BLDV-SCNC: 0.9 MMOL/L (ref 0.4–2)
LYMPHOCYTES NFR BLD: 2.27 K/UL
LYMPHOCYTES RELATIVE PERCENT: 24 % (ref 24–44)
MCH RBC QN AUTO: 31.1 PG (ref 27–31)
MCHC RBC AUTO-ENTMCNC: 33.8 G/DL (ref 32–36)
MCV RBC AUTO: 91.9 FL (ref 78–100)
MONOCYTES NFR BLD: 0.56 K/UL
MONOCYTES NFR BLD: 6 % (ref 0–4)
NEUTROPHILS NFR BLD: 68 % (ref 36–66)
NEUTS SEG NFR BLD: 6.3 K/UL
PLATELET # BLD AUTO: 211 K/UL (ref 140–440)
PMV BLD AUTO: 9.3 FL (ref 7.5–11.1)
POTASSIUM SERPL-SCNC: 4.2 MMOL/L (ref 3.5–5.1)
PROT SERPL-MCNC: 6.5 G/DL (ref 6.4–8.2)
RBC # BLD AUTO: 3.96 M/UL (ref 4.6–6.2)
SODIUM SERPL-SCNC: 139 MMOL/L (ref 135–145)
WBC OTHER # BLD: 9.3 K/UL (ref 4–10.5)

## 2024-12-24 PROCEDURE — 93005 ELECTROCARDIOGRAM TRACING: CPT | Performed by: EMERGENCY MEDICINE

## 2024-12-24 PROCEDURE — 73610 X-RAY EXAM OF ANKLE: CPT

## 2024-12-24 PROCEDURE — 99285 EMERGENCY DEPT VISIT HI MDM: CPT

## 2024-12-24 PROCEDURE — 83605 ASSAY OF LACTIC ACID: CPT

## 2024-12-24 PROCEDURE — 80053 COMPREHEN METABOLIC PANEL: CPT

## 2024-12-24 PROCEDURE — 73590 X-RAY EXAM OF LOWER LEG: CPT

## 2024-12-24 PROCEDURE — 85025 COMPLETE CBC W/AUTO DIFF WBC: CPT

## 2024-12-24 PROCEDURE — 87040 BLOOD CULTURE FOR BACTERIA: CPT

## 2024-12-24 RX ORDER — SULFAMETHOXAZOLE AND TRIMETHOPRIM 800; 160 MG/1; MG/1
1 TABLET ORAL 2 TIMES DAILY
Qty: 20 TABLET | Refills: 0 | Status: SHIPPED | OUTPATIENT
Start: 2024-12-24 | End: 2025-01-03

## 2024-12-24 RX ORDER — SODIUM CHLORIDE 0.9 % (FLUSH) 0.9 %
5-40 SYRINGE (ML) INJECTION EVERY 12 HOURS SCHEDULED
Status: DISCONTINUED | OUTPATIENT
Start: 2024-12-24 | End: 2024-12-24 | Stop reason: HOSPADM

## 2024-12-24 RX ORDER — SODIUM CHLORIDE 0.9 % (FLUSH) 0.9 %
5-40 SYRINGE (ML) INJECTION PRN
Status: DISCONTINUED | OUTPATIENT
Start: 2024-12-24 | End: 2024-12-24 | Stop reason: HOSPADM

## 2024-12-24 RX ORDER — CEPHALEXIN 500 MG/1
500 CAPSULE ORAL 4 TIMES DAILY
Qty: 28 CAPSULE | Refills: 0 | Status: SHIPPED | OUTPATIENT
Start: 2024-12-24 | End: 2024-12-31

## 2024-12-24 RX ORDER — SODIUM CHLORIDE 9 MG/ML
INJECTION, SOLUTION INTRAVENOUS PRN
Status: DISCONTINUED | OUTPATIENT
Start: 2024-12-24 | End: 2024-12-24 | Stop reason: HOSPADM

## 2024-12-24 RX ORDER — DOXYCYCLINE 100 MG/1
CAPSULE ORAL
COMMUNITY
Start: 2024-11-26

## 2024-12-24 RX ORDER — OXYCODONE AND ACETAMINOPHEN 7.5; 325 MG/1; MG/1
TABLET ORAL
COMMUNITY
Start: 2024-12-10

## 2024-12-24 RX ORDER — HYDROCODONE BITARTRATE AND ACETAMINOPHEN 5; 325 MG/1; MG/1
1 TABLET ORAL ONCE
Status: DISCONTINUED | OUTPATIENT
Start: 2024-12-24 | End: 2024-12-24 | Stop reason: HOSPADM

## 2024-12-24 ASSESSMENT — PAIN - FUNCTIONAL ASSESSMENT: PAIN_FUNCTIONAL_ASSESSMENT: 0-10

## 2024-12-24 ASSESSMENT — PAIN SCALES - GENERAL
PAINLEVEL_OUTOF10: 10
PAINLEVEL_OUTOF10: 10

## 2024-12-24 ASSESSMENT — PAIN DESCRIPTION - PAIN TYPE
TYPE: ACUTE PAIN;CHRONIC PAIN
TYPE: ACUTE PAIN;CHRONIC PAIN

## 2024-12-24 ASSESSMENT — PAIN DESCRIPTION - ORIENTATION
ORIENTATION: RIGHT
ORIENTATION: RIGHT

## 2024-12-24 ASSESSMENT — PAIN DESCRIPTION - LOCATION
LOCATION: LEG
LOCATION: LEG

## 2024-12-24 ASSESSMENT — LIFESTYLE VARIABLES
HOW OFTEN DO YOU HAVE A DRINK CONTAINING ALCOHOL: NEVER
HOW MANY STANDARD DRINKS CONTAINING ALCOHOL DO YOU HAVE ON A TYPICAL DAY: PATIENT DOES NOT DRINK

## 2024-12-24 NOTE — ED PROVIDER NOTES
Emergency Department Encounter    Patient: Michael Moise  MRN: [id number]  : 1975  Date of Evaluation: 2024  ED Provider:  Keya Bach MD    Triage Chief Complaint:   Wound Infection (Pt states Infection In Leg right open wound x2 weeks. Pt has hx of surgery in leg and hard ware. Pt is currently out of doxycycline antibiotics finished 3 days ago. Pt said this was his second round of antibiotics. )    False Pass:  Michael Moise is a 49 y.o. male that presents with complaint of right leg infection.  Had a wound that opened about 2 weeks ago.  Was on erythromycin and now doxycycline and finished that 3 days ago.  Another area kind of blistered now.  He is having increased pain.  He reports over the last 3 years he has had several different surgeries, infections, has had recurrent issues.  He was not able to see his orthopedic doctor.  He has an appointment with his PCP on 10 January.  He was concerned because it is in the holiday, and so came in to be seen.  No fevers.  No streaking up the leg.  The red discoloration at his lower shin has been like that since his last surgery which was over a year ago, and he reports he was told it was due to soft tissue damage.  No chills.  No nausea or vomiting.  He is able to ambulate.    ROS - see HPI, below listed is current ROS at time of my eval:  10 systems reviewed and negative except as above.     Past Medical History:   Diagnosis Date    Fibula fracture     Hypertension     Tibia fracture      Past Surgical History:   Procedure Laterality Date    FRACTURE SURGERY      right leg     FRACTURE SURGERY       Family History   Problem Relation Age of Onset    Cancer Mother         throat    Cancer Father         bladder    Heart Attack Maternal Grandfather      Social History     Socioeconomic History    Marital status:      Spouse name: Not on file    Number of children: Not on file    Years of education: Not on file    Highest education level: Not on file

## 2024-12-24 NOTE — ED TRIAGE NOTES
Pt states Infection In Leg right open wound x2 weeks. Pt has hx of surgery in leg and hard ware. Pt is currently out of doxycycline antibiotics finished 3 days ago. Pt said this was his second round of antibiotics. Pt sees  pain management for leg.

## 2024-12-25 LAB
EKG ATRIAL RATE: 69 BPM
EKG DIAGNOSIS: NORMAL
EKG P AXIS: 41 DEGREES
EKG P-R INTERVAL: 188 MS
EKG Q-T INTERVAL: 368 MS
EKG QRS DURATION: 98 MS
EKG QTC CALCULATION (BAZETT): 394 MS
EKG R AXIS: 24 DEGREES
EKG T AXIS: 37 DEGREES
EKG VENTRICULAR RATE: 69 BPM

## 2024-12-25 PROCEDURE — 93010 ELECTROCARDIOGRAM REPORT: CPT | Performed by: INTERNAL MEDICINE

## 2024-12-29 LAB
MICROORGANISM SPEC CULT: NORMAL
SERVICE CMNT-IMP: NORMAL
SPECIMEN DESCRIPTION: NORMAL

## 2025-01-16 ENCOUNTER — TELEPHONE (OUTPATIENT)
Dept: FAMILY MEDICINE CLINIC | Age: 50
End: 2025-01-16

## 2025-01-16 DIAGNOSIS — T84.7XXS INFECTED HARDWARE IN RIGHT LEG, SEQUELA: Primary | ICD-10-CM

## 2025-01-16 NOTE — TELEPHONE ENCOUNTER
Patient called office stating he was just seen at the wound clinic regarding cellulitis of his leg and they are concerned that the infection could be deeper and into the bone. Patient would like to see an orthopedic surgeon and is requesting a referral sent to Dr. Choudhury. Fax number [phone]

## 2025-01-16 NOTE — TELEPHONE ENCOUNTER
Patient called back stated he gave the wrong name for provider it is Dr. Reyes same numbers though

## 2025-04-02 ENCOUNTER — TELEPHONE (OUTPATIENT)
Dept: WOUND CARE | Age: 50
End: 2025-04-02

## 2025-04-04 ENCOUNTER — HOSPITAL ENCOUNTER (OUTPATIENT)
Dept: WOUND CARE | Age: 50
Discharge: HOME | End: 2025-04-04
Attending: NURSE PRACTITIONER
Payer: MEDICAID

## 2025-04-04 VITALS
DIASTOLIC BLOOD PRESSURE: 75 MMHG | RESPIRATION RATE: 16 BRPM | TEMPERATURE: 98 F | SYSTOLIC BLOOD PRESSURE: 116 MMHG | HEART RATE: 78 BPM

## 2025-04-04 PROCEDURE — 99214 OFFICE O/P EST MOD 30 MIN: CPT

## 2025-04-04 PROCEDURE — 11042 DBRDMT SUBQ TIS 1ST 20SQCM/<: CPT

## 2025-04-04 ASSESSMENT — PAIN DESCRIPTION - FREQUENCY: FREQUENCY: CONTINUOUS

## 2025-04-04 ASSESSMENT — PAIN DESCRIPTION - ONSET: ONSET: ON-GOING

## 2025-04-04 ASSESSMENT — PAIN DESCRIPTION - LOCATION: LOCATION: LEG

## 2025-04-04 ASSESSMENT — PAIN SCALES - GENERAL: PAINLEVEL_OUTOF10: 9

## 2025-04-04 ASSESSMENT — PAIN - FUNCTIONAL ASSESSMENT: PAIN_FUNCTIONAL_ASSESSMENT: PREVENTS OR INTERFERES SOME ACTIVE ACTIVITIES AND ADLS

## 2025-04-04 ASSESSMENT — PAIN DESCRIPTION - DESCRIPTORS: DESCRIPTORS: POUNDING

## 2025-04-04 ASSESSMENT — PAIN DESCRIPTION - PAIN TYPE: TYPE: CHRONIC PAIN

## 2025-04-04 NOTE — PROGRESS NOTES
Multilayer Compression Wrap   (Not Unna) Below the Knee    NAME:  Michael Moise  YOB: 1975  MEDICAL RECORD NUMBER:  [id number]  DATE:  4/4/2025    Multilayer compression wrap: Removed old Multilayer wrap if indicated and wash leg with mild soap/water.  Applied moisturizing agent to dry skin as needed.   Applied primary and secondary dressing as ordered.  Applied multilayered dressing below the knee to right lower leg.  Instructed patient/caregiver not to remove dressing and to keep it clean and dry.   Instructed patient/caregiver on complications to report to provider, such as pain, numbness in toes, heavy drainage, and slippage of dressing.  Instructed patient on purpose of compression dressing and on activity and exercise recommendations.      Electronically signed by Ángel Duarte RN on 4/4/2025 at 3:45 PM

## 2025-04-04 NOTE — PATIENT INSTRUCTIONS
PHYSICIAN ORDERS AND DISCHARGE INSTRUCTIONS     Wound Care Notes:           Orders for this week:  2025       Right Anterior Lower Leg osteomyelitis:Wash with soap and water. Pat dry.   Apply gentamicin and stimulen powder to wound bed  Cover with ABD  Wrap with coban 2 lite  Change: Leave in place until next visit to wound care center       Dispense 30 day quantity when ordering supplies.  Plan:        Follow Up Instructions: with Dr Giulia Bryant   Primary Wound Care Provider: Farooq Fernando CNP   Call  for any questions or concerns.  Central [phone] for imaging and lab work

## 2025-04-07 PROBLEM — T81.89XD SURGICAL WOUND, NON HEALING, SUBSEQUENT ENCOUNTER: Status: ACTIVE | Noted: 2025-04-07

## 2025-04-07 PROBLEM — L97.912 SKIN ULCER OF RIGHT LOWER LEG, WITH FAT LAYER EXPOSED (HCC): Status: ACTIVE | Noted: 2025-04-07

## 2025-04-08 ENCOUNTER — HOSPITAL ENCOUNTER (OUTPATIENT)
Dept: WOUND CARE | Age: 50
Discharge: HOME | End: 2025-04-08
Attending: NURSE PRACTITIONER
Payer: MEDICAID

## 2025-04-08 VITALS
TEMPERATURE: 99.2 F | HEART RATE: 74 BPM | RESPIRATION RATE: 16 BRPM | DIASTOLIC BLOOD PRESSURE: 79 MMHG | SYSTOLIC BLOOD PRESSURE: 116 MMHG

## 2025-04-08 DIAGNOSIS — T81.89XD SURGICAL WOUND, NON HEALING, SUBSEQUENT ENCOUNTER: ICD-10-CM

## 2025-04-08 DIAGNOSIS — L97.912 SKIN ULCER OF RIGHT LOWER LEG, WITH FAT LAYER EXPOSED (HCC): Primary | ICD-10-CM

## 2025-04-08 PROCEDURE — 99213 OFFICE O/P EST LOW 20 MIN: CPT

## 2025-04-08 PROCEDURE — 99213 OFFICE O/P EST LOW 20 MIN: CPT | Performed by: SURGERY

## 2025-04-08 RX ORDER — GENTAMICIN SULFATE 1 MG/G
OINTMENT TOPICAL PRN
OUTPATIENT
Start: 2025-04-08

## 2025-04-08 RX ORDER — NEOMYCIN/BACITRACIN/POLYMYXINB 3.5-400-5K
OINTMENT (GRAM) TOPICAL PRN
OUTPATIENT
Start: 2025-04-08

## 2025-04-08 RX ORDER — SODIUM CHLOR/HYPOCHLOROUS ACID 0.033 %
SOLUTION, IRRIGATION IRRIGATION PRN
OUTPATIENT
Start: 2025-04-08

## 2025-04-08 RX ORDER — CLOBETASOL PROPIONATE 0.5 MG/G
OINTMENT TOPICAL PRN
OUTPATIENT
Start: 2025-04-08

## 2025-04-08 RX ORDER — LIDOCAINE 40 MG/G
CREAM TOPICAL PRN
OUTPATIENT
Start: 2025-04-08

## 2025-04-08 RX ORDER — BETAMETHASONE DIPROPIONATE 0.5 MG/G
CREAM TOPICAL PRN
OUTPATIENT
Start: 2025-04-08

## 2025-04-08 RX ORDER — LIDOCAINE HYDROCHLORIDE 40 MG/ML
SOLUTION TOPICAL PRN
OUTPATIENT
Start: 2025-04-08

## 2025-04-08 RX ORDER — SILVER SULFADIAZINE 10 MG/G
CREAM TOPICAL PRN
OUTPATIENT
Start: 2025-04-08

## 2025-04-08 RX ORDER — BACITRACIN ZINC AND POLYMYXIN B SULFATE 500; 1000 [USP'U]/G; [USP'U]/G
OINTMENT TOPICAL PRN
OUTPATIENT
Start: 2025-04-08

## 2025-04-08 RX ORDER — LIDOCAINE 50 MG/G
OINTMENT TOPICAL PRN
OUTPATIENT
Start: 2025-04-08

## 2025-04-08 RX ORDER — LIDOCAINE HYDROCHLORIDE 20 MG/ML
JELLY TOPICAL PRN
OUTPATIENT
Start: 2025-04-08

## 2025-04-08 RX ORDER — GINSENG 100 MG
CAPSULE ORAL PRN
OUTPATIENT
Start: 2025-04-08

## 2025-04-08 RX ORDER — TRIAMCINOLONE ACETONIDE 1 MG/G
OINTMENT TOPICAL PRN
OUTPATIENT
Start: 2025-04-08

## 2025-04-08 RX ORDER — MUPIROCIN 20 MG/G
OINTMENT TOPICAL PRN
OUTPATIENT
Start: 2025-04-08

## 2025-04-08 ASSESSMENT — PAIN SCALES - GENERAL: PAINLEVEL_OUTOF10: 4

## 2025-04-08 ASSESSMENT — PAIN DESCRIPTION - LOCATION: LOCATION: LEG

## 2025-04-08 ASSESSMENT — PAIN DESCRIPTION - ORIENTATION: ORIENTATION: RIGHT

## 2025-04-08 ASSESSMENT — PAIN DESCRIPTION - FREQUENCY: FREQUENCY: CONTINUOUS

## 2025-04-08 ASSESSMENT — PAIN DESCRIPTION - PAIN TYPE: TYPE: CHRONIC PAIN

## 2025-04-08 ASSESSMENT — PAIN DESCRIPTION - DESCRIPTORS: DESCRIPTORS: THROBBING

## 2025-04-08 NOTE — PATIENT INSTRUCTIONS
PHYSICIAN ORDERS AND DISCHARGE INSTRUCTIONS     Wound Care Notes:  ID Consult 25         Orders for this week:  2025       Right Anterior Lower Leg osteomyelitis:Wash with soap and water. Pat dry.   Apply gentamicin and stimulen powder to wound bed  Cover with ABD  Wrap with conform        Dispense 30 day quantity when ordering supplies.  Plan:        Follow Up Instructions: Discharge from wound clinic, follow up in Surgery office with Dr. Platt  Primary Wound Care Provider: Farooq Fernando CNP   Call  for any questions or concerns.  Central [phone] for imaging and lab work

## 2025-04-08 NOTE — PROGRESS NOTES
Wound Care Center Progress Visit      Michael Moise  AGE: 49 y.o.   GENDER: male  : 1975  EPISODE DATE:  2025   Referred by: Marvel Eller MD     Subjective:     CHIEF COMPLAINT  WOUND   Problem List Items Addressed This Visit          Musculoskeletal and Integument    Skin ulcer of right lower leg, with fat layer exposed (HCC) - Primary    Relevant Orders    Initiate Outpatient Wound Care Protocol    Initiate Oxygen Therapy Protocol       Other    Surgical wound of right lower leg, non healing, subsequent encounter    Relevant Orders    Initiate Outpatient Wound Care Protocol    Initiate Oxygen Therapy Protocol     Chief Complaint   Patient presents with    Wound Check        HISTORY of PRESENT ILLNESS      Michael Moise is a 49 y.o. male who presents to the Wound Clinic for evaluation and treatment of Chronic non-healing surgical  ulcer(s) of  R lower leg anterior.  The condition is of moderate severity. The initial fracture of the shaft of the fibula and tibia occurred in  requiring hardware placement and the patient has had recurrent issues with his right lower extremity since then.  The underlying cause is thought to be surgical.  The patients care to date has included removal of some existing hardware, antibiotics, deep cultures and bead antibiotics.  Advanced wound care modalities established with initiation of care at the wound clinic.The patient has significant underlying medical conditions as below. Marvel Eller MD .    Living Situation  [x] Home [] SNF []  Assisted living  [] Other (ie rehab, etc.) [] Home Health  []  Transportation    Wound Pain Timing/Severity: constant, severe  Quality of pain: pounding  Severity of pain:  9 / 10   Modifying Factors: decreased mobility and smoking  Associated Signs/Symptoms: edema, erythema, and pain    Wound status:  2025     Initial visit to the [facility]. The patient has a nonhealing surgical wound to his

## 2025-04-09 ENCOUNTER — TELEPHONE (OUTPATIENT)
Dept: BARIATRICS/WEIGHT MGMT | Age: 50
End: 2025-04-09

## 2025-04-09 NOTE — TELEPHONE ENCOUNTER
----- Message from Dr. Luciano Platt, DO sent at 4/8/2025 11:16 AM EDT -----  Can you give patient a 2-3 -week follow-up with me thank you

## 2025-04-09 NOTE — TELEPHONE ENCOUNTER
Chronic non-healing surgical  ulcer(s) of  R lower leg anterior.     Ref to rgeis from [facility] for a call back.

## 2025-06-04 ENCOUNTER — COMMUNITY OUTREACH (OUTPATIENT)
Dept: FAMILY MEDICINE CLINIC | Age: 50
End: 2025-06-04